# Patient Record
Sex: FEMALE | Race: WHITE | NOT HISPANIC OR LATINO | Employment: OTHER | URBAN - METROPOLITAN AREA
[De-identification: names, ages, dates, MRNs, and addresses within clinical notes are randomized per-mention and may not be internally consistent; named-entity substitution may affect disease eponyms.]

---

## 2017-03-01 PROBLEM — Z01.419 WELL WOMAN EXAM WITH ROUTINE GYNECOLOGICAL EXAM: Chronic | Status: ACTIVE | Noted: 2017-03-01

## 2017-07-10 ENCOUNTER — TRANSCRIBE ORDERS (OUTPATIENT)
Dept: PHYSICAL THERAPY | Facility: HOSPITAL | Age: 77
End: 2017-07-10

## 2017-07-10 ENCOUNTER — HOSPITAL ENCOUNTER (OUTPATIENT)
Dept: PHYSICAL THERAPY | Facility: HOSPITAL | Age: 77
Setting detail: THERAPIES SERIES
Discharge: HOME | End: 2017-07-10

## 2017-07-10 DIAGNOSIS — M51.36 DDD (DEGENERATIVE DISC DISEASE), LUMBAR: Primary | ICD-10-CM

## 2017-07-10 DIAGNOSIS — G89.29 CHRONIC LEFT-SIDED LOW BACK PAIN WITH LEFT-SIDED SCIATICA: Primary | ICD-10-CM

## 2017-07-10 DIAGNOSIS — M54.42 CHRONIC LEFT-SIDED LOW BACK PAIN WITH LEFT-SIDED SCIATICA: Primary | ICD-10-CM

## 2017-07-10 PROCEDURE — G8979 MOBILITY GOAL STATUS: HCPCS

## 2017-07-10 PROCEDURE — 97161 PT EVAL LOW COMPLEX 20 MIN: CPT

## 2017-07-10 PROCEDURE — G8978 MOBILITY CURRENT STATUS: HCPCS

## 2017-07-10 NOTE — THERAPY EVALUATION
Outpatient Physical Therapy Ortho Initial Evaluation   [facility]     Patient Name: Julia Blackmon  : 1940  MRN: [id number]  Today's Date: 7/10/2017      Visit Date: 07/10/2017    Patient Active Problem List   Diagnosis   • Well woman exam with routine gynecological exam        Past Medical History:   Diagnosis Date   • Arthritis    • Coronary artery disease    • Depression    • Diabetes mellitus    • History of chronic back pain         Past Surgical History:   Procedure Laterality Date   • CHOLECYSTECTOMY     • EYE SURGERY     • HYSTERECTOMY     • PACEMAKER IMPLANTATION         Visit Dx:     ICD-10-CM ICD-9-CM   1. Chronic left-sided low back pain with left-sided sciatica M54.42 724.2    G89.29 724.3     338.29             Patient History       07/10/17 1000          History    Chief Complaint Pain  -GB      Type of Pain --   ~25 years ago  -GB      Brief Description of Current Complaint Lois reports that she has had ongoing back pain in mid to left side with some pain into buttock and hip for about 25 years.  She has had multiple injections and medications without success.  Pain in her lower back is described as sharp and shooting, with deep ache when she sits and shifts weight toward her left side. Pain tends to be worst as the day progresses.  She gets the most relief with use of ice.  -GB      Patient's Rating of General Health Poor  -GB      Occupation/sports/leisure activities She enjoys social time with family and friends.  -GB      Pain     Pain Location Back  -GB      Pain at Present 7  -GB      Pain at Best 5  -GB      Pain at Worst 8  -GB      Fall Risk Assessment    Any falls in the past year: Yes  -GB      Number of falls reported in the last 12 months 3  -GB      Factors that contributed to the fall: --   Unknown causes  -GB      Daily Activities    Primary Language Angolan  -GB      Action taken if English not primary language --   None needed, she has been in [place] for [place] years and is  fluent  -GB      Barriers to learning Hearing;Visual  -GB      Pt Participated in POC and Goals Yes  -GB      Safety    Are you being hurt, hit, or frightened by anyone at home or in your life? No  -GB        User Key  (r) = Recorded By, (t) = Taken By, (c) = Cosigned By    Initials Name Provider Type    GB Chan Hand, PT Physical Therapist                PT Ortho       07/10/17 1700    Posture/Observations    Posture/Observations Comments Slumped posture with decreased lordosis;  -GB    Myotomal Screen- Lower Quarter Clearing    Hip flexion (L2) Bilateral:;4+ (Good +)  -GB    Knee extension (L3) Bilateral:;4+ (Good +)  -GB    Ankle DF (L4) Bilateral:;4+ (Good +)  -GB    Great toe extension (L5) Bilateral:;4+ (Good +)  -GB    Ankle PF (S1) Bilateral:;3+ (Fair +)  -GB    Knee flexion (S2) Bilateral:;4 (Good)  -GB    SI/Hip Screen- Lower Quarter Clearing    ASIS compression Left:;Positive  -GB    Maikol's/Volodymyr's test Left:;Negative  -GB    Special Tests/Palpation    Special Tests/Palpation Lumbar/SI;Hip  -GB    Lumbosacral Palpation    SI Left:;Tender  -GB    Lumbosacral Segment Left:;Guarded/taut   Left rotation at L4,5  -GB    Lumbar/SI Special Tests    SLR (Neural Tension) Left:;Negative  -GB    Hip/Thigh Palpation    Greater Trochanter --   Not tender  -GB    Leg Length Test    Apparent Unequal;Left Higher Leg   Left posterior rotation of Ilium  -GB    Trunk    Flexion AROM Deficit 45  -GB    Extension AROM Deficit 25  -GB    Lt Lat Flexion AROM Deficit 28  -GB    Right Lateral Flexion AROM Deficit 25  -GB    Lt Rotation AROM Deficit 25  -GB    Right Rotation AROM Deficit 25  -GB    Left Hip    Hip Extension Gross Movement (4+/5) good plus  -GB    Hip ABduction Gross Movement (4/5) good  -GB    Hip ADduction Gross Movement (4/5) good  -GB    Right Hip    Hip Extension Gross Movement (4+/5) good plus  -GB    Hip ABduction Gross Movement (4/5) good  -GB    Hip ADduction Gross Movement (4/5) good  -GB     Lower Extremity Flexibility    Hamstrings Bilateral:;Mildly limited  -GB      User Key  (r) = Recorded By, (t) = Taken By, (c) = Cosigned By    Initials Name Provider Type    ANSELMO Hand, PT Physical Therapist                                PT OP Goals       07/10/17 1700       PT Short Term Goals    STG Date to Achieve 07/24/17  -GB     STG 1 Patient is independent with HEP for flexibility and initial strengthening.  -GB     STG 1 Progress New  -GB     STG 2 Patient reports pain is reduced by 25%.  -GB     STG 2 Progress New  -GB     Long Term Goals    LTG Date to Achieve 08/09/17  -GB     LTG 1 Patient is independent with HEP for strengthening and stabilization.  -GB     LTG 1 Progress New  -GB     LTG 2 Modified Oswestry score is reduced by at least 10%.  -GB     LTG 2 Progress New  -GB     Time Calculation    PT Goal Re-Cert Due Date 08/09/17  -GB       User Key  (r) = Recorded By, (t) = Taken By, (c) = Cosigned By    Initials Name Provider Type    ANSELMO Hand, PT Physical Therapist                PT Assessment/Plan       07/10/17 1747 07/10/17 1744    PT Assessment    Functional Limitations  Performance in leisure activities;Performance in self-care ADL;Limitation in home management;Limitations in functional capacity and performance;Limitations in community activities  -GB    Impairments  Range of motion;Posture;Poor body mechanics;Pain;Muscle strength;Joint integrity;Joint mobility  -GB    Assessment Comments  Patient has findings consistent with diagnosis.  She has mixed signs that may suggest SI and DDD issues.  Because of the length of nature of her problems, PT does not anticipate any chnages for at least the first few weeks.  -GB    Please refer to paper survey for additional self-reported information  Yes  -GB    Rehab Potential  Fair  -GB    Patient/caregiver participated in establishment of treatment plan and goals  Yes  -GB    Patient would benefit from skilled therapy  intervention  Yes  -GB    PT Plan    PT Frequency  2x/week  -GB    Predicted Duration of Therapy Intervention (days/wks)  4 weeks  -GB    Planned CPT's?  PT EVAL LOW COMPLEXITY: 03840;PT NEUROMUSC RE-EDUCATION EA 15 MIN: 14861;PT MANUAL THERAPY EA 15 MIN: 58525;PT THER PROC EA 15 MIN: 92123;PT HOT OR COLD PACK TREAT MCARE  -GB    PT Plan Comments Progress with ther ex, and incorporate Dry needling and Astym with other manual therapy tenhniques.  -GB       User Key  (r) = Recorded By, (t) = Taken By, (c) = Cosigned By    Initials Name Provider Type    ANSELMO Hand, PT Physical Therapist                 Manual Rx (last 36 hours)      Manual Treatments       07/10/17 1700          Manual Rx 1    Manual Rx 1 Location (B) Lumbar paravertebrals, left Gluteus Medius, and Maxium  -GB      Manual Rx 1 Type Dry Needling  -GB      Manual Rx 1 Duration 5-6  -GB        User Key  (r) = Recorded By, (t) = Taken By, (c) = Cosigned By    Initials Name Provider Type    ANSELMO Hand, PT Physical Therapist                            Outcome Measures       07/10/17 1700          Modified Oswestry    Modified Oswestry Score/Comments 44%  -      Functional Assessment    Outcome Measure Options Modifed Owestry  -GB        User Key  (r) = Recorded By, (t) = Taken By, (c) = Cosigned By    Initials Name Provider Type    ANSELMO Hand, PT Physical Therapist            Time Calculation:   Start Time: 1015  Total Timed Code Minutes- PT: 5 minute(s)     Therapy Charges for Today     Code Description Service Date Service Provider Modifiers Qty    46556173393 HC PT MOBILITY CURRENT 7/10/2017 Chan Hand, PT GP, CK 1    07570299863 HC PT MOBILITY PROJECTED 7/10/2017 Chan Hand, PT GP, CJ 1    02071703667 HC PT EVAL LOW COMPLEXITY 4 7/10/2017 Chan Hand, PT GP 1          PT G-Codes  PT Professional Judgement Used?: Yes  Outcome Measure Options: Modifed Owestry  Score: 44%  Functional  Limitation: Mobility: Walking and moving around  Mobility: Walking and Moving Around Current Status (): At least 40 percent but less than 60 percent impaired, limited or restricted  Mobility: Walking and Moving Around Goal Status (): At least 20 percent but less than 40 percent impaired, limited or restricted         Chan Hand, PT  7/10/2017

## 2017-07-13 ENCOUNTER — HOSPITAL ENCOUNTER (OUTPATIENT)
Dept: PHYSICAL THERAPY | Facility: HOSPITAL | Age: 77
Setting detail: THERAPIES SERIES
Discharge: HOME | End: 2017-07-13

## 2017-07-13 DIAGNOSIS — G89.29 CHRONIC LEFT-SIDED LOW BACK PAIN WITH LEFT-SIDED SCIATICA: Primary | ICD-10-CM

## 2017-07-13 DIAGNOSIS — M54.42 CHRONIC LEFT-SIDED LOW BACK PAIN WITH LEFT-SIDED SCIATICA: Primary | ICD-10-CM

## 2017-07-13 PROCEDURE — 97110 THERAPEUTIC EXERCISES: CPT

## 2017-07-13 PROCEDURE — 97140 MANUAL THERAPY 1/> REGIONS: CPT

## 2017-07-13 NOTE — THERAPY TREATMENT NOTE
Outpatient Physical Therapy Ortho Treatment Note   [place]     Patient Name: Julia Blackmon  : 1940  MRN: [id number]  Today's Date: 2017      Visit Date: 2017    Visit Dx:    ICD-10-CM ICD-9-CM   1. Chronic left-sided low back pain with left-sided sciatica M54.42 724.2    G89.29 724.3     338.29       Patient Active Problem List   Diagnosis   • Well woman exam with routine gynecological exam        Past Medical History:   Diagnosis Date   • Arthritis    • Coronary artery disease    • Depression    • Diabetes mellitus    • History of chronic back pain         Past Surgical History:   Procedure Laterality Date   • CHOLECYSTECTOMY     • EYE SURGERY     • HYSTERECTOMY     • PACEMAKER IMPLANTATION               PT Ortho       07/10/17 1700    Posture/Observations    Posture/Observations Comments Slumped posture with decreased lordosis;  -GB    Myotomal Screen- Lower Quarter Clearing    Hip flexion (L2) Bilateral:;4+ (Good +)  -GB    Knee extension (L3) Bilateral:;4+ (Good +)  -GB    Ankle DF (L4) Bilateral:;4+ (Good +)  -GB    Great toe extension (L5) Bilateral:;4+ (Good +)  -GB    Ankle PF (S1) Bilateral:;3+ (Fair +)  -GB    Knee flexion (S2) Bilateral:;4 (Good)  -GB    SI/Hip Screen- Lower Quarter Clearing    ASIS compression Left:;Positive  -GB    Maikol's/Volodymyr's test Left:;Negative  -GB    Special Tests/Palpation    Special Tests/Palpation Lumbar/SI;Hip  -GB    Lumbosacral Palpation    SI Left:;Tender  -GB    Lumbosacral Segment Left:;Guarded/taut   Left rotation at L4,5  -GB    Lumbar/SI Special Tests    SLR (Neural Tension) Left:;Negative  -GB    Hip/Thigh Palpation    Greater Trochanter --   Not tender  -GB    Leg Length Test    Apparent Unequal;Left Higher Leg   Left posterior rotation of Ilium  -GB    Trunk    Flexion AROM Deficit 45  -GB    Extension AROM Deficit 25  -GB    Lt Lat Flexion AROM Deficit 28  -GB    Right Lateral Flexion AROM Deficit 25  -GB    Lt Rotation AROM Deficit  25  -GB    Right Rotation AROM Deficit 25  -GB    Left Hip    Hip Extension Gross Movement (4+/5) good plus  -GB    Hip ABduction Gross Movement (4/5) good  -GB    Hip ADduction Gross Movement (4/5) good  -GB    Right Hip    Hip Extension Gross Movement (4+/5) good plus  -GB    Hip ABduction Gross Movement (4/5) good  -GB    Hip ADduction Gross Movement (4/5) good  -GB    Lower Extremity Flexibility    Hamstrings Bilateral:;Mildly limited  -GB      User Key  (r) = Recorded By, (t) = Taken By, (c) = Cosigned By    Initials Name Provider Type    GB Chanrivera Hand, PT Physical Therapist                            PT Assessment/Plan       07/13/17 1030       PT Assessment    Assessment Comments Client was very hesitant to perform any exercises because she is always been sore afterward in the past.  She demonstrated exercises in the clinic well.  She noted a little discomfort with seated ball rollouts.  She reported some relief with manual therapy today.  -MM     PT Plan    PT Plan Comments Continue per plan of treatment with exercises as able.  Continue manual therapy.  -MM       User Key  (r) = Recorded By, (t) = Taken By, (c) = Cosigned By    Initials Name Provider Type    MM Mickey Melvin, PT Physical Therapist                    Exercises       07/13/17 1030          Subjective Comments    Subjective Comments Client reports she continues to have quite a bit of low back and left hip pain.  She says she has tried exercising in the past each time she has had increased pain afterward.  She does exercise at the [facility], but only uses the [employer] and recumbent bike.  -MM      Subjective Pain    Able to rate subjective pain? yes  -MM      Pre-Treatment Pain Level 6  -MM      Post-Treatment Pain Level 6  -MM      Exercise 1    Exercise Name 1 Included exercises in clinical setting per flow sheet.  -MM      Cueing 1 Verbal;Tactile;Demo  -MM      Time (Minutes) 1 15  -MM      Treatment Type 1 Ther Ex  -MM        User Key   (r) = Recorded By, (t) = Taken By, (c) = Cosigned By    Initials Name Provider Type    MM Mickey Melvin PT Physical Therapist                        Manual Rx (last 36 hours)      Manual Treatments       07/13/17 1030          Manual Rx 1    Manual Rx 1 Location Included dry needling to lumbar paravertebrals, left medial gluteal region, left gluteus medius, and piriformis posterior to greater trochanter. (5 min) also included ASTYM with soft tissue mobilization for low back and posterior lateral left hip.  Tenderness noted throughout.  Client was positioned prone.  Moderate pressure was used with ASTYM.  -MM      Manual Rx 1 Duration 30  -MM        User Key  (r) = Recorded By, (t) = Taken By, (c) = Cosigned By    Initials Name Provider Type    DANIEL Melvin PT Physical Therapist                        Outcome Measures       07/10/17 1700          Modified Oswestry    Modified Oswestry Score/Comments 44%  -GB      Functional Assessment    Outcome Measure Options Modifed Owestry  -GB        User Key  (r) = Recorded By, (t) = Taken By, (c) = Cosigned By    Initials Name Provider Type    ANSELMO Hand PT Physical Therapist            Time Calculation:   Start Time: 1030  Total Timed Code Minutes- PT: 45 minute(s)    Therapy Charges for Today     Code Description Service Date Service Provider Modifiers Qty    24337942005  PT MANUAL THERAPY EA 15 MIN 7/13/2017 Mickey Melvni, PT GP 2    80998449694 HC PT THER PROC EA 15 MIN 7/13/2017 Mickey Melvin, PT GP 1                    Mickey Mevlin, PT  7/13/2017

## 2017-07-18 ENCOUNTER — HOSPITAL ENCOUNTER (OUTPATIENT)
Dept: PHYSICAL THERAPY | Facility: HOSPITAL | Age: 77
Setting detail: THERAPIES SERIES
Discharge: HOME | End: 2017-07-18

## 2017-07-18 DIAGNOSIS — G89.29 CHRONIC LEFT-SIDED LOW BACK PAIN WITH LEFT-SIDED SCIATICA: Primary | ICD-10-CM

## 2017-07-18 DIAGNOSIS — M54.42 CHRONIC LEFT-SIDED LOW BACK PAIN WITH LEFT-SIDED SCIATICA: Primary | ICD-10-CM

## 2017-07-18 PROCEDURE — 97110 THERAPEUTIC EXERCISES: CPT

## 2017-07-18 PROCEDURE — 97140 MANUAL THERAPY 1/> REGIONS: CPT

## 2017-07-18 NOTE — THERAPY TREATMENT NOTE
Outpatient Physical Therapy Ortho Treatment Note  [facility]     Patient Name: Julia Blackmon  : 1940  MRN: [id number]  Today's Date: 2017      Visit Date: 2017    Visit Dx:    ICD-10-CM ICD-9-CM   1. Chronic left-sided low back pain with left-sided sciatica M54.42 724.2    G89.29 724.3     338.29       Patient Active Problem List   Diagnosis   • Well woman exam with routine gynecological exam        Past Medical History:   Diagnosis Date   • Arthritis    • Coronary artery disease    • Depression    • Diabetes mellitus    • History of chronic back pain         Past Surgical History:   Procedure Laterality Date   • CHOLECYSTECTOMY     • EYE SURGERY     • HYSTERECTOMY     • PACEMAKER IMPLANTATION                               PT Assessment/Plan       17 1030       PT Assessment    Assessment Comments Overall no changes in pain yet. She continues with quite a bit of low back and left hip discomfort. Exercises were tolerated without complaints today.  -MM     PT Plan    PT Plan Comments Continue per plan of treatment with manual therapy and exercises.   -MM       User Key  (r) = Recorded By, (t) = Taken By, (c) = Cosigned By    Initials Name Provider Type    DANIEL Melvin, PT Physical Therapist                    Exercises       17 1030          Subjective Comments    Subjective Comments Client reports left hip pain today at 7/10.  -MM      Subjective Pain    Able to rate subjective pain? yes  -MM      Pre-Treatment Pain Level 7  -MM      Post-Treatment Pain Level 7  -MM      Exercise 1    Exercise Name 1 INcluded exercises in clinical setting per flow sheet. Progressed exercises to include: total gym squats, standing 3-way leg kicks, nerve glides, and modified piriformis stretch.  -MM      Cueing 1 Verbal  -MM      Time (Minutes) 1 15  -MM      Treatment Type 1 Ther Ex  -MM        User Key  (r) = Recorded By, (t) = Taken By, (c) = Cosigned By    Initials Name Provider Type     MM Mickey Melvin, PT Physical Therapist                        Manual Rx (last 36 hours)      Manual Treatments       07/18/17 1030          Manual Rx 1    Manual Rx 1 Location Included dry needling to lumbar paravertebrals, left medial gluteal region, left gluteus medius, and piriformis posterior to greater trochanter. (5 min) also included ASTYM with soft tissue mobilization for low back and posterior lateral left hip.  Tenderness noted throughout.  Client was positioned prone.  Moderate pressure was used with ASTYM.  -MM      Manual Rx 1 Duration 30  -MM        User Key  (r) = Recorded By, (t) = Taken By, (c) = Cosigned By    Initials Name Provider Type    DANIEL Melvin, PT Physical Therapist                        Time Calculation:   Start Time: 1030  Total Timed Code Minutes- PT: 45 minute(s)    Therapy Charges for Today     Code Description Service Date Service Provider Modifiers Qty    78137293717  PT MANUAL THERAPY EA 15 MIN 7/18/2017 Mickey Melvin, PT GP 2    [id number]  PT THER PROC EA 15 MIN 7/18/2017 iMckey Melvin, PT GP 1                    Mickey Melvin, PT  7/18/2017

## 2017-07-20 ENCOUNTER — HOSPITAL ENCOUNTER (OUTPATIENT)
Dept: PHYSICAL THERAPY | Facility: HOSPITAL | Age: 77
Setting detail: THERAPIES SERIES
Discharge: HOME | End: 2017-07-20

## 2017-07-20 DIAGNOSIS — G89.29 CHRONIC LEFT-SIDED LOW BACK PAIN WITH LEFT-SIDED SCIATICA: Primary | ICD-10-CM

## 2017-07-20 DIAGNOSIS — M54.42 CHRONIC LEFT-SIDED LOW BACK PAIN WITH LEFT-SIDED SCIATICA: Primary | ICD-10-CM

## 2017-07-20 PROCEDURE — 97140 MANUAL THERAPY 1/> REGIONS: CPT

## 2017-07-20 PROCEDURE — 97110 THERAPEUTIC EXERCISES: CPT

## 2017-07-20 NOTE — THERAPY TREATMENT NOTE
Outpatient Physical Therapy Ortho Treatment Note  [facility]     Patient Name: Julia Blackmon  : 1940  MRN: [id number]  Today's Date: 2017      Visit Date: 2017    Visit Dx:    ICD-10-CM ICD-9-CM   1. Chronic left-sided low back pain with left-sided sciatica M54.42 724.2    G89.29 724.3     338.29       Patient Active Problem List   Diagnosis   • Well woman exam with routine gynecological exam        Past Medical History:   Diagnosis Date   • Arthritis    • Coronary artery disease    • Depression    • Diabetes mellitus    • History of chronic back pain         Past Surgical History:   Procedure Laterality Date   • CHOLECYSTECTOMY     • EYE SURGERY     • HYSTERECTOMY     • PACEMAKER IMPLANTATION                               PT Assessment/Plan       17 0900       PT Assessment    Assessment Comments Client notes some improvement in lateral hip pain. Low back pain continues. Exercises tolerated without complaints. Tenderness noted with manual therapy, but no complaints.  -MM     PT Plan    PT Plan Comments Continue per plan of treatment with manual therapy and exercises.  -MM       User Key  (r) = Recorded By, (t) = Taken By, (c) = Cosigned By    Initials Name Provider Type    MM Mickey Melvin, PT Physical Therapist                    Exercises       17 0900          Subjective Comments    Subjective Comments Client reports that her hip is feeling a little better, but her back still hurts quite a bit.  -MM      Subjective Pain    Able to rate subjective pain? yes  -MM      Pre-Treatment Pain Level 6  -MM      Post-Treatment Pain Level 6  -MM      Exercise 1    Exercise Name 1 Continued exercises in clinical setting per flow sheet. Updated exercises to include 3-way leg kicks with band and lower trunk rotation stretch.  -MM      Cueing 1 Verbal  -MM      Time (Minutes) 1 20  -MM      Treatment Type 1 Ther Ex  -MM        User Key  (r) = Recorded By, (t) = Taken By, (c) = Cosigned  By    Initials Name Provider Type    DANIEL Melvin PT Physical Therapist                        Manual Rx (last 36 hours)      Manual Treatments       07/20/17 0900          Manual Rx 1    Manual Rx 1 Location Included dry needling to lumbar paravertebrals, left medial gluteal region, left gluteus medius, and piriformis posterior to greater trochanter. also included ASTYM with soft tissue mobilization for low back and posterior lateral left hip.  Tenderness noted throughout.  Client was positioned prone.  Moderate pressure was used with ASTYM.  -MM      Manual Rx 1 Duration 25  -MM        User Key  (r) = Recorded By, (t) = Taken By, (c) = Cosigned By    Initials Name Provider Type    DANIEL Melvin PT Physical Therapist                        Time Calculation:   Start Time: 0900  Total Timed Code Minutes- PT: 45 minute(s)    Therapy Charges for Today     Code Description Service Date Service Provider Modifiers Qty    70252765735  PT MANUAL THERAPY EA 15 MIN 7/20/2017 Mickey Melvin PT GP 2    [id number]  PT THER PROC EA 15 MIN 7/20/2017 Mickey Melvin PT GP 1                    Mickey Melvin PT  7/20/2017

## 2017-07-25 ENCOUNTER — HOSPITAL ENCOUNTER (OUTPATIENT)
Dept: PHYSICAL THERAPY | Facility: HOSPITAL | Age: 77
Setting detail: THERAPIES SERIES
Discharge: HOME | End: 2017-07-25

## 2017-07-25 DIAGNOSIS — G89.29 CHRONIC LEFT-SIDED LOW BACK PAIN WITH LEFT-SIDED SCIATICA: Primary | ICD-10-CM

## 2017-07-25 DIAGNOSIS — M54.42 CHRONIC LEFT-SIDED LOW BACK PAIN WITH LEFT-SIDED SCIATICA: Primary | ICD-10-CM

## 2017-07-25 PROCEDURE — 97110 THERAPEUTIC EXERCISES: CPT

## 2017-07-25 PROCEDURE — 97140 MANUAL THERAPY 1/> REGIONS: CPT

## 2017-07-25 NOTE — THERAPY TREATMENT NOTE
Outpatient Physical Therapy Ortho Treatment Note  [facility]     Patient Name: Julia Blackmon  : 1940  MRN: [id number]  Today's Date: 2017      Visit Date: 2017    Visit Dx:    ICD-10-CM ICD-9-CM   1. Chronic left-sided low back pain with left-sided sciatica M54.42 724.2    G89.29 724.3     338.29       Patient Active Problem List   Diagnosis   • Well woman exam with routine gynecological exam        Past Medical History:   Diagnosis Date   • Arthritis    • Coronary artery disease    • Depression    • Diabetes mellitus    • History of chronic back pain         Past Surgical History:   Procedure Laterality Date   • CHOLECYSTECTOMY     • EYE SURGERY     • HYSTERECTOMY     • PACEMAKER IMPLANTATION                               PT Assessment/Plan       17 0945       PT Assessment    Assessment Comments Client reports quite a bit of pain at the time of treatment today.  She still tolerated exercises in the clinic without increased complaints.  She noted some relief with dry needling and ASTYM.  -MM     PT Plan    PT Plan Comments Continue per plan of treatment with exercises and manual therapy.  -MM       User Key  (r) = Recorded By, (t) = Taken By, (c) = Cosigned By    Initials Name Provider Type    MM Mickey Melvin, PT Physical Therapist                    Exercises       17 0905          Subjective Comments    Subjective Comments Client reports severe pain today at 8/10.  She started having quite a bit of pain yesterday.  She did feel pretty good after last visit for a couple days.  -MM      Subjective Pain    Able to rate subjective pain? yes  -MM      Pre-Treatment Pain Level 8  -MM      Post-Treatment Pain Level 6  -MM      Exercise 1    Exercise Name 1 Continued exercises in clinical setting per flow sheet. Updated exercises to include 3-way leg kicks with band and lower trunk rotation stretch.  -MM      Cueing 1 Verbal  -MM      Time (Minutes) 1 20  -MM      Treatment Type  1 Ther Ex  -MM        User Key  (r) = Recorded By, (t) = Taken By, (c) = Cosigned By    Initials Name Provider Type    DANIEL Melvin PT Physical Therapist                        Manual Rx (last 36 hours)      Manual Treatments       07/25/17 0945          Manual Rx 1    Manual Rx 1 Location Included dry needling to lumbar paravertebrals, left medial gluteal region, left gluteus medius, and piriformis posterior to greater trochanter. also included ASTYM with soft tissue mobilization for low back and posterior lateral left hip.  Tenderness noted throughout.  Client was positioned prone.  Moderate pressure was used with ASTYM.  -MM      Manual Rx 1 Duration 25  -MM        User Key  (r) = Recorded By, (t) = Taken By, (c) = Cosigned By    Initials Name Provider Type    DANIEL Melvin PT Physical Therapist                        Time Calculation:   Start Time: 0945  Total Timed Code Minutes- PT: 45 minute(s)    Therapy Charges for Today     Code Description Service Date Service Provider Modifiers Qty    27405480443  PT MANUAL THERAPY EA 15 MIN 7/25/2017 Mickey Melvin, PT GP 2    80992109948 HC PT THER PROC EA 15 MIN 7/25/2017 Mickey Melvin, PT GP 1                    Mickey Melvin PT  7/25/2017

## 2017-07-27 ENCOUNTER — TRANSCRIBE ORDERS (OUTPATIENT)
Dept: ADMINISTRATIVE | Facility: HOSPITAL | Age: 77
End: 2017-07-27

## 2017-07-27 ENCOUNTER — HOSPITAL ENCOUNTER (OUTPATIENT)
Dept: PHYSICAL THERAPY | Facility: HOSPITAL | Age: 77
Setting detail: THERAPIES SERIES
Discharge: HOME | End: 2017-07-27

## 2017-07-27 DIAGNOSIS — M54.42 CHRONIC LEFT-SIDED LOW BACK PAIN WITH LEFT-SIDED SCIATICA: Primary | ICD-10-CM

## 2017-07-27 DIAGNOSIS — M75.41 IMPINGEMENT SYNDROME OF RIGHT SHOULDER: Primary | ICD-10-CM

## 2017-07-27 DIAGNOSIS — G89.29 CHRONIC LEFT-SIDED LOW BACK PAIN WITH LEFT-SIDED SCIATICA: Primary | ICD-10-CM

## 2017-07-27 PROCEDURE — 97140 MANUAL THERAPY 1/> REGIONS: CPT

## 2017-07-27 PROCEDURE — 97110 THERAPEUTIC EXERCISES: CPT

## 2017-07-27 NOTE — THERAPY TREATMENT NOTE
Outpatient Physical Therapy Ortho Treatment Note  [facility]     Patient Name: Julia Blackmon  : 1940  MRN: [id number]  Today's Date: 2017      Visit Date: 2017    Visit Dx:    ICD-10-CM ICD-9-CM   1. Chronic left-sided low back pain with left-sided sciatica M54.42 724.2    G89.29 724.3     338.29       Patient Active Problem List   Diagnosis   • Well woman exam with routine gynecological exam        Past Medical History:   Diagnosis Date   • Arthritis    • Coronary artery disease    • Depression    • Diabetes mellitus    • History of chronic back pain         Past Surgical History:   Procedure Laterality Date   • CHOLECYSTECTOMY     • EYE SURGERY     • HYSTERECTOMY     • PACEMAKER IMPLANTATION                               PT Assessment/Plan       17       PT Assessment    Assessment Comments Client reports some improvement and left hip pain.  Low back pain continues.  Exercises were progressed today and tolerated without complaints of increased pain.  -MM     PT Plan    PT Plan Comments Continue per plan of treatment with exercises and manual therapy.  -MM       User Key  (r) = Recorded By, (t) = Taken By, (c) = Cosigned By    Initials Name Provider Type    MM Mickey Melvin, PT Physical Therapist                    Exercises       17          Subjective Comments    Subjective Comments Client reports low back pain continues.  She has noticed some improvement with left hip pain.  -MM      Subjective Pain    Able to rate subjective pain? yes  -MM      Pre-Treatment Pain Level 7  -MM      Post-Treatment Pain Level 7  -MM      Exercise 1    Exercise Name 1 Included exercises in clinical setting per flow sheet.  Updated exercises to include: Reverse lunges in parallel bars, bridging with stability ball, lower trunk rotation with stability ball, and leg raises on all fours.  -MM      Cueing 1 Tactile;Verbal;Demo  -MM      Time (Minutes) 1 30  -MM        User Key  (r) =  Recorded By, (t) = Taken By, (c) = Cosigned By    Initials Name Provider Type    DANIEL Melvin, PT Physical Therapist                        Manual Rx (last 36 hours)      Manual Treatments       07/27/17 0915          Manual Rx 1    Manual Rx 1 Location Included dry needling to lumbar paravertebrals, left medial gluteal region, left gluteus medius, and piriformis posterior to greater trochanter. also included ASTYM with soft tissue mobilization for low back and posterior lateral left hip.  Tenderness noted throughout.  Client was positioned prone.  Moderate pressure was used with ASTYM.  -MM      Manual Rx 1 Duration 15  -MM        User Key  (r) = Recorded By, (t) = Taken By, (c) = Cosigned By    Initials Name Provider Type    DANIEL Melvin PT Physical Therapist                        Time Calculation:   Start Time: 0915  Total Timed Code Minutes- PT: 45 minute(s)    Therapy Charges for Today     Code Description Service Date Service Provider Modifiers Qty    48359175149  PT MANUAL THERAPY EA 15 MIN 7/27/2017 Mickey Melvin, PT GP 1    [id number]  PT THER PROC EA 15 MIN 7/27/2017 Mickey Melvin, PT GP 2                    Mickey Melvin, PT  7/27/2017

## 2017-08-02 ENCOUNTER — HOSPITAL ENCOUNTER (OUTPATIENT)
Dept: PHYSICAL THERAPY | Facility: HOSPITAL | Age: 77
Setting detail: THERAPIES SERIES
Discharge: HOME | End: 2017-08-02

## 2017-08-02 DIAGNOSIS — M54.42 CHRONIC LEFT-SIDED LOW BACK PAIN WITH LEFT-SIDED SCIATICA: Primary | ICD-10-CM

## 2017-08-02 DIAGNOSIS — G89.29 CHRONIC LEFT-SIDED LOW BACK PAIN WITH LEFT-SIDED SCIATICA: Primary | ICD-10-CM

## 2017-08-02 PROCEDURE — 97110 THERAPEUTIC EXERCISES: CPT

## 2017-08-02 PROCEDURE — 97140 MANUAL THERAPY 1/> REGIONS: CPT

## 2017-08-02 NOTE — THERAPY TREATMENT NOTE
Outpatient Physical Therapy Ortho Treatment Note  [facility]     Patient Name: Julia Blackmon  : 1940  MRN: [id number]  Today's Date: 2017      Visit Date: 2017    Visit Dx:    ICD-10-CM ICD-9-CM   1. Chronic left-sided low back pain with left-sided sciatica M54.42 724.2    G89.29 724.3     338.29       Patient Active Problem List   Diagnosis   • Well woman exam with routine gynecological exam        Past Medical History:   Diagnosis Date   • Arthritis    • Coronary artery disease    • Depression    • Diabetes mellitus    • History of chronic back pain         Past Surgical History:   Procedure Laterality Date   • CHOLECYSTECTOMY     • EYE SURGERY     • HYSTERECTOMY     • PACEMAKER IMPLANTATION                               PT Assessment/Plan       17 1039       PT Assessment    Assessment Comments Patient is with little change and remains in severe pain.  She sturggles with any stretching and because of lack of success with current interventions, Dry Needling held today to focus on potential benefits from Astym.  Only minimal abnormal tissue texture is noticed with Astym to left gluteal and (B) lower paravertebrals.  -GB     PT Plan    PT Plan Comments Continue with care x 1 more week until reassessment.  -GB       User Key  (r) = Recorded By, (t) = Taken By, (c) = Cosigned By    Initials Name Provider Type    ANSELMO Hand, PT Physical Therapist                    Exercises       17 1000          Subjective Comments    Subjective Comments Patient feels that nothing is helping.  She notices increased soreness when stretching, but like doing leg exercises.  -GB      Subjective Pain    Able to rate subjective pain? yes  -GB      Pre-Treatment Pain Level 7  -GB      Post-Treatment Pain Level 7  -GB      Exercise 1    Exercise Name 1 Ther ex in clinical setting as per written flow sheet  -GB      Time (Minutes) 1 20  -GB        User Key  (r) = Recorded By, (t) = Taken By,  (c) = Cosigned By    Initials Name Provider Type    ANSELMO Hand, PT Physical Therapist                        Manual Rx (last 36 hours)      Manual Treatments       08/02/17 0900          Manual Rx 1    Manual Rx 1 Location (B) Lumbosacral areas and upper gluteal areas  -GB      Manual Rx 1 Type Astym, STM  -GB      Manual Rx 1 Duration 12  -GB        User Key  (r) = Recorded By, (t) = Taken By, (c) = Cosigned By    Initials Name Provider Type    ANSELMO Hand, PT Physical Therapist                    Therapy Education       08/02/17 1042          Therapy Education    Given Symptoms/condition management   Time spent discussing treatment options and other therapy options  -GB        User Key  (r) = Recorded By, (t) = Taken By, (c) = Cosigned By    Initials Name Provider Type    ANSELMO Hand, PT Physical Therapist                Time Calculation:   Start Time: 0905  Total Timed Code Minutes- PT: 34 minute(s)    Therapy Charges for Today     Code Description Service Date Service Provider Modifiers Qty    95978174234  PT THER PROC EA 15 MIN 8/2/2017 Chan Hand, PT GP 1    [id number]  PT MANUAL THERAPY EA 15 MIN 8/2/2017 Chan Hand, PT GP 1                    Chan Hand, PT  8/2/2017

## 2017-08-03 ENCOUNTER — APPOINTMENT (OUTPATIENT)
Dept: CT IMAGING | Facility: HOSPITAL | Age: 77
End: 2017-08-03

## 2017-08-03 ENCOUNTER — APPOINTMENT (OUTPATIENT)
Dept: GENERAL RADIOLOGY | Facility: HOSPITAL | Age: 77
End: 2017-08-03

## 2017-08-07 ENCOUNTER — HOSPITAL ENCOUNTER (OUTPATIENT)
Dept: PHYSICAL THERAPY | Facility: HOSPITAL | Age: 77
Setting detail: THERAPIES SERIES
Discharge: HOME | End: 2017-08-07

## 2017-08-07 DIAGNOSIS — M54.42 CHRONIC LEFT-SIDED LOW BACK PAIN WITH LEFT-SIDED SCIATICA: Primary | ICD-10-CM

## 2017-08-07 DIAGNOSIS — G89.29 CHRONIC LEFT-SIDED LOW BACK PAIN WITH LEFT-SIDED SCIATICA: Primary | ICD-10-CM

## 2017-08-07 PROCEDURE — G8978 MOBILITY CURRENT STATUS: HCPCS

## 2017-08-07 PROCEDURE — 97140 MANUAL THERAPY 1/> REGIONS: CPT

## 2017-08-07 PROCEDURE — G8979 MOBILITY GOAL STATUS: HCPCS

## 2017-08-07 NOTE — THERAPY PROGRESS REPORT/RE-CERT
Outpatient Physical Therapy Ortho Re-Assessment   [facility]     Patient Name: Julia Blackmon  : 1940  MRN: [id number]  Today's Date: 2017      Visit Date: 2017    Patient Active Problem List   Diagnosis   • Well woman exam with routine gynecological exam        Past Medical History:   Diagnosis Date   • Arthritis    • Coronary artery disease    • Depression    • Diabetes mellitus    • History of chronic back pain         Past Surgical History:   Procedure Laterality Date   • CHOLECYSTECTOMY     • EYE SURGERY     • HYSTERECTOMY     • PACEMAKER IMPLANTATION         Visit Dx:     ICD-10-CM ICD-9-CM   1. Chronic left-sided low back pain with left-sided sciatica M54.42 724.2    G89.29 724.3     338.29                 PT Ortho       17 0900    Posture/Observations    Posture/Observations Comments Slumped posture with decreased lordosis;  -MM    Myotomal Screen- Lower Quarter Clearing    Hip flexion (L2) Right:;4+ (Good +);Left:;4- (Good -)  -MM    Knee extension (L3) Right:;4+ (Good +);Left:;4 (Good)  -MM    Ankle DF (L4) Right:;5 (Normal);Left:;4+ (Good +)  -MM    Ankle PF (S1) Right:;5 (Normal);Left:;4 (Good)  -MM    Knee flexion (S2) Bilateral:;4+ (Good +)  -MM    Lumbosacral Palpation    SI Left:;Tender  -MM    Lumbosacral Segment Left:;Guarded/taut   Left rotation at L4,5  -MM    Trunk    Flexion AROM Deficit 45  -MM    Extension AROM Deficit 20  -MM    Lt Lat Flexion AROM Deficit 28  -MM    Right Lateral Flexion AROM Deficit 25  -MM    Lt Rotation AROM Deficit 25  -MM    Right Rotation AROM Deficit 25  -MM    Left Hip    Hip Extension Gross Movement (4+/5) good plus  -MM    Hip ABduction Gross Movement (4/5) good  -MM    Hip ADduction Gross Movement (5/5) normal  -MM    Right Hip    Hip Extension Gross Movement (4+/5) good plus  -MM    Hip ABduction Gross Movement (4+/5) good plus  -MM    Hip ADduction Gross Movement (5/5) normal  -MM    Lower Extremity Flexibility    Hamstrings  Bilateral:;Mildly limited  -MM      User Key  (r) = Recorded By, (t) = Taken By, (c) = Cosigned By    Initials Name Provider Type    DANIEL Melvin, PT Physical Therapist                                PT OP Goals       08/07/17 0900       PT Short Term Goals    STG Date to Achieve 07/24/17  -MM     STG 1 Patient is independent with HEP for flexibility and initial strengthening.  -MM     STG 1 Progress Met  -MM     STG 2 Patient reports pain is reduced by 25%.  -MM     STG 2 Progress Ongoing  -MM     Long Term Goals    LTG Date to Achieve 08/09/17  -MM     LTG 1 Patient is independent with HEP for strengthening and stabilization.  -MM     LTG 1 Progress Ongoing  -MM     LTG 2 Modified Oswestry score is reduced by at least 10%.  -MM     LTG 2 Progress Ongoing  -MM     LTG 2 Progress Comments 44% to 62%  -MM     Time Calculation    PT Goal Re-Cert Due Date 09/06/17  -MM       User Key  (r) = Recorded By, (t) = Taken By, (c) = Cosigned By    Initials Name Provider Type    DANIEL Melvin, PT Physical Therapist                PT Assessment/Plan       08/07/17 0900       PT Assessment    Assessment Comments Client continues with low back and left hip pain. She has noticed some benefit in left hip discomfort since starting dry needling. Signs are consistent with low back and left hip pain related to degenerative changes in her spine. Left hip strength is impaired. Further skilled care is recommended for 2-3 more visits to minimize pain.  -MM     PT Plan    PT Frequency 2x/week  -MM     Predicted Duration of Therapy Intervention (days/wks) 2-3 more visits  -MM     Planned CPT's? PT THER PROC EA 15 MIN: 08862;PT MANUAL THERAPY EA 15 MIN: 34491  -MM     PT Plan Comments Continue for 2-3 more visits with manual therapy to minimize pain.  -MM       User Key  (r) = Recorded By, (t) = Taken By, (c) = Cosigned By    Initials Name Provider Type    DANIEL Melvin, PT Physical Therapist                  Exercises        08/07/17 0900          Subjective Comments    Subjective Comments Client reports back pain today at 7/10. She does feel like her hip has improved some, but her back still hurts a lot. Last visit she did not receive dry needling and she felt that dry needling with ASTYM was more helpful than AStYM alone.  -MM      Subjective Pain    Able to rate subjective pain? yes  -MM      Pre-Treatment Pain Level 7  -MM      Post-Treatment Pain Level 7  -MM        User Key  (r) = Recorded By, (t) = Taken By, (c) = Cosigned By    Initials Name Provider Type    DANIEL Melvin PT Physical Therapist           Manual Rx (last 36 hours)      Manual Treatments       08/07/17 0900          Manual Rx 1    Manual Rx 1 Location Re-exam time included in manual therapy. Also included dry needling for bilateral lumbar paraspinals and left lateral and posterior hip. Included ASTYM after dry needling.   -MM      Manual Rx 1 Duration 45  -MM        User Key  (r) = Recorded By, (t) = Taken By, (c) = Cosigned By    Initials Name Provider Type    DANIEL Melvin PT Physical Therapist                            Outcome Measures       08/07/17 1100 08/07/17 0900       Modified Oswestry    Modified Oswestry Score/Comments  62%  -MM     Functional Assessment    Outcome Measure Options --  -MM Modifed Owestry  -MM       User Key  (r) = Recorded By, (t) = Taken By, (c) = Cosigned By    Initials Name Provider Type    DANIEL Melvin PT Physical Therapist            Time Calculation:   Start Time: 0900  Total Timed Code Minutes- PT: 45 minute(s)     Therapy Charges for Today     Code Description Service Date Service Provider Modifiers Qty    65861668717 HC PT MOBILITY CURRENT 8/7/2017 Mickey Melvin, PT GP, CK 1    89299821524 HC PT MOBILITY PROJECTED 8/7/2017 Mickey Melvin, PT GP, CJ 1    [id number] HC PT MANUAL THERAPY EA 15 MIN 8/7/2017 Mickey Melvin, PT GP 3          PT G-Codes  PT Professional Judgement Used?: Yes  Outcome  Measure Options: Neil Quiñonez  Score: 62%  Functional Limitation: Mobility: Walking and moving around  Mobility: Walking and Moving Around Current Status (): At least 40 percent but less than 60 percent impaired, limited or restricted  Mobility: Walking and Moving Around Goal Status (): At least 20 percent but less than 40 percent impaired, limited or restricted         Mickey Melvin, PT  8/7/2017

## 2017-08-10 ENCOUNTER — HOSPITAL ENCOUNTER (OUTPATIENT)
Dept: PHYSICAL THERAPY | Facility: HOSPITAL | Age: 77
Setting detail: THERAPIES SERIES
Discharge: HOME | End: 2017-08-10

## 2017-08-10 DIAGNOSIS — G89.29 CHRONIC LEFT-SIDED LOW BACK PAIN WITH LEFT-SIDED SCIATICA: Primary | ICD-10-CM

## 2017-08-10 DIAGNOSIS — M54.42 CHRONIC LEFT-SIDED LOW BACK PAIN WITH LEFT-SIDED SCIATICA: Primary | ICD-10-CM

## 2017-08-10 PROCEDURE — 97140 MANUAL THERAPY 1/> REGIONS: CPT

## 2017-08-10 PROCEDURE — G8978 MOBILITY CURRENT STATUS: HCPCS

## 2017-08-10 PROCEDURE — 97110 THERAPEUTIC EXERCISES: CPT

## 2017-08-10 PROCEDURE — G8979 MOBILITY GOAL STATUS: HCPCS

## 2017-08-10 PROCEDURE — G8980 MOBILITY D/C STATUS: HCPCS

## 2017-08-10 NOTE — THERAPY DISCHARGE NOTE
Outpatient Physical Therapy Ortho Treatment Note/Discharge Summary   [facility]     Patient Name: Julia Blackmon  : 1940  MRN: [id number]  Today's Date: 8/10/2017      Visit Date: 08/10/2017    Visit Dx:    ICD-10-CM ICD-9-CM   1. Chronic left-sided low back pain with left-sided sciatica M54.42 724.2    G89.29 724.3     338.29       Patient Active Problem List   Diagnosis   • Well woman exam with routine gynecological exam        Past Medical History:   Diagnosis Date   • Arthritis    • Coronary artery disease    • Depression    • Diabetes mellitus    • History of chronic back pain         Past Surgical History:   Procedure Laterality Date   • CHOLECYSTECTOMY     • EYE SURGERY     • HYSTERECTOMY     • PACEMAKER IMPLANTATION                               PT Assessment/Plan       08/10/17 09       PT Assessment    Assessment Comments Client continues with significant low back pain. Hip pain improved some and she noted some benefit from dry needling and manual therapy, but overall she hasn't noticed much of a change. She has reached the maximum benefit of therapy and is being discharged due to lack of progress. She has a good understanding of exercises to continue on her own to maximize function.  -MM     PT Plan    PT Plan Comments Discharge due to lack of progress.  -MM       User Key  (r) = Recorded By, (t) = Taken By, (c) = Cosigned By    Initials Name Provider Type    MM Mickey Melvin, PT Physical Therapist                    Exercises       08/10/17 09          Subjective Comments    Subjective Comments Client reports a little relief with Dry needling and AStYM, but overall she continues with quite a bit of pain.   -MM      Subjective Pain    Able to rate subjective pain? yes  -MM      Pre-Treatment Pain Level 7  -MM      Post-Treatment Pain Level 7  -MM      Exercise 1    Exercise Name 1 Updated and reviewed home program. Provided handout for home.  -MM      Cueing 1 Verbal;Tactile  -MM       Time (Minutes) 1 15  -MM        User Key  (r) = Recorded By, (t) = Taken By, (c) = Cosigned By    Initials Name Provider Type    DANIEL Melvin PT Physical Therapist                        Manual Rx (last 36 hours)      Manual Treatments       08/10/17 0900          Manual Rx 1    Manual Rx 1 Location Included dry needling for bilateral lumbar paraspinals and left lateral and posterior hip. Included ASTYM after dry needling.   -MM      Manual Rx 1 Duration 30  -MM        User Key  (r) = Recorded By, (t) = Taken By, (c) = Cosigned By    Initials Name Provider Type    DANIEL Melvin PT Physical Therapist                PT OP Goals       08/10/17 0900       PT Short Term Goals    STG Date to Achieve 07/24/17  -MM     STG 1 Patient is independent with HEP for flexibility and initial strengthening.  -MM     STG 1 Progress Met  -MM     STG 2 Patient reports pain is reduced by 25%.  -MM     STG 2 Progress Not Met  -MM     Long Term Goals    LTG Date to Achieve 08/09/17  -MM     LTG 1 Patient is independent with HEP for strengthening and stabilization.  -MM     LTG 1 Progress Met  -MM     LTG 2 Modified Oswestry score is reduced by at least 10%.  -MM     LTG 2 Progress Not Met  -MM       User Key  (r) = Recorded By, (t) = Taken By, (c) = Cosigned By    Initials Name Provider Type    DANIEL Melvin PT Physical Therapist                    Time Calculation:   Start Time: 0900  Total Timed Code Minutes- PT: 45 minute(s)    Therapy Charges for Today     Code Description Service Date Service Provider Modifiers Qty    69854087303 HC PT MOBILITY CURRENT 8/10/2017 Mickey Melvin, PT GP, CK 1    48991517418 HC PT MOBILITY PROJECTED 8/10/2017 Mickey Melvin, PT GP, CJ 1    01338916273 HC PT MOBILITY DISCHARGE 8/10/2017 Mickey Melvin PT GP, CK 1    76709618140 HC PT MANUAL THERAPY EA 15 MIN 8/10/2017 Mickey Melvin, PT GP 2    [id number] HC PT THER PROC EA 15 MIN 8/10/2017 Mickey Melvin, PT GP 1           PT G-Codes  PT Professional Judgement Used?: Yes  Functional Limitation: Mobility: Walking and moving around  Mobility: Walking and Moving Around Current Status (): At least 40 percent but less than 60 percent impaired, limited or restricted  Mobility: Walking and Moving Around Goal Status (): At least 20 percent but less than 40 percent impaired, limited or restricted  Mobility: Walking and Moving Around Discharge Status (): At least 40 percent but less than 60 percent impaired, limited or restricted     OP PT Discharge Summary  Date of Discharge: 08/10/17  Reason for Discharge: Maximum functional potential achieved  Outcomes Achieved: Refer to plan of care for updates on goals achieved  Discharge Destination: Home with home program  Discharge Instructions: Client to return to MD to explore other options to minimize pain. She plans to continue exercises as able.       Mickey Melvin, PT  8/10/2017

## 2017-09-18 ENCOUNTER — HOSPITAL ENCOUNTER (OUTPATIENT)
Dept: PHYSICAL THERAPY | Facility: HOSPITAL | Age: 77
Setting detail: THERAPIES SERIES
Discharge: HOME | End: 2017-09-18

## 2017-09-18 ENCOUNTER — TRANSCRIBE ORDERS (OUTPATIENT)
Dept: PHYSICAL THERAPY | Facility: HOSPITAL | Age: 77
End: 2017-09-18

## 2017-09-18 DIAGNOSIS — G89.29 CHRONIC PAIN OF BOTH SHOULDERS: Primary | ICD-10-CM

## 2017-09-18 DIAGNOSIS — M25.511 RIGHT SHOULDER PAIN, UNSPECIFIED CHRONICITY: Primary | ICD-10-CM

## 2017-09-18 DIAGNOSIS — M25.511 CHRONIC PAIN OF BOTH SHOULDERS: Primary | ICD-10-CM

## 2017-09-18 DIAGNOSIS — M67.911 DISORDER OF RIGHT ROTATOR CUFF: ICD-10-CM

## 2017-09-18 DIAGNOSIS — M25.512 CHRONIC PAIN OF BOTH SHOULDERS: Primary | ICD-10-CM

## 2017-09-18 PROCEDURE — G8985 CARRY GOAL STATUS: HCPCS

## 2017-09-18 PROCEDURE — 97162 PT EVAL MOD COMPLEX 30 MIN: CPT

## 2017-09-18 PROCEDURE — G8984 CARRY CURRENT STATUS: HCPCS

## 2017-09-18 NOTE — THERAPY EVALUATION
Outpatient Physical Therapy Ortho Initial Evaluation  [facility]     Patient Name: Julia Blackmon  : 1940  MRN: [id number]  Today's Date: 2017      Visit Date: 2017    Patient Active Problem List   Diagnosis   • Well woman exam with routine gynecological exam        Past Medical History:   Diagnosis Date   • Arthritis    • Coronary artery disease    • Depression    • Diabetes mellitus    • History of chronic back pain         Past Surgical History:   Procedure Laterality Date   • CHOLECYSTECTOMY     • EYE SURGERY     • HYSTERECTOMY     • PACEMAKER IMPLANTATION         Visit Dx:     ICD-10-CM ICD-9-CM   1. Chronic pain of both shoulders M25.512 719.41    G89.29 338.29    M25.511    2. Disorder of right rotator cuff M67.911 726.10             Patient History       17 0845          History    Chief Complaint Pain;Muscle weakness;Muscle tenderness;Joint stiffness;Difficulty with daily activities  -MM      Type of Pain Shoulder pain  -MM      Date Current Problem(s) Began 17  -MM      Brief Description of Current Complaint Client presents with complaints of bilateral shoulder pain.  She reports right shoulder pain is frequently severe.  She also has some left shoulder pain.  She's had pain in her shoulders for the past 8 months.  She was referred for physical therapy with diagnosis of rotator cuff tendinopathy. She also complains of neck stiffness.  -MM      Patient/Caregiver Goals Relieve pain;Improve mobility;Improve strength  -MM      Occupation/sports/leisure activities She enjoys social time with family and friends.  -MM      Pain     Pain Location Shoulder   R > L  -MM      Pain at Present 9  -MM      Pain at Best 7  -MM      Pain at Worst 10  -MM      Pain Frequency Constant/continuous  -MM      Pain Description Aching;Sharp;Shooting  -MM      Pain Comments shoulder pain is worse with mobility. Her shoulders also ache at night.  -MM      Difficulties with ADL's? reaching,  lifting, brushing hair  -MM      Fall Risk Assessment    Any falls in the past year: Yes  -MM      Number of falls reported in the last 12 months 3  -MM      Factors that contributed to the fall: --   Unknown causes  -MM      Daily Activities    Primary Language [place]  -MM      Action taken if English not primary language --   None needed, she has been in [place] years and is fluent  -MM      Barriers to learning Hearing;Visual  -MM      Pt Participated in POC and Goals Yes  -MM        User Key  (r) = Recorded By, (t) = Taken By, (c) = Cosigned By    Initials Name Provider Type    DANIEL Melvin PT Physical Therapist                PT Ortho       09/18/17 3393    Posture/Observations    Posture/Observations Comments Slumped posture with decreased lordosis;  -MM    ROM (Range of Motion)    General ROM Detail Palpation: R: Tenderness clavicle, anterior shoulder, and lateral shoulder.  Left: Tenderness lateral shoulder.  Overall right shoulder mobility is significantly limited passively and actively.  Passive mobility is slightly better than active.  -MM    Left Shoulder    Flexion AROM Deficit 88°  -MM    Extension AROM Deficit 40°  -MM    ABduction AROM Deficit 82°  -MM    External Rotation PROM Deficit 47°  -MM    Internal Rotation AROM Deficit T7  -MM    Right Shoulder    Flexion AROM Deficit 58° with pain  -MM    Extension AROM Deficit 40°   -MM    ABduction AROM Deficit 60° with pain  -MM    External Rotation AROM Deficit 18° with pain  -MM    Internal Rotation AROM Deficit L1  -MM    MMT (Manual Muscle Testing)    General MMT Assessment Detail Left shoulder strength is 4 minus/5 throughout.  Unable to test right shoulder strength due to significant pain and limited mobility.  -MM      User Key  (r) = Recorded By, (t) = Taken By, (c) = Cosigned By    Initials Name Provider Type    DANIEL Melvin PT Physical Therapist                            Therapy Education       09/18/17 1615           Therapy Education    Given HEP  -MM      Program New  -MM      How Provided Verbal;Demonstration;Written  -MM      Provided to Patient  -MM      Level of Understanding Verbalized  -MM        User Key  (r) = Recorded By, (t) = Taken By, (c) = Cosigned By    Initials Name Provider Type    DANIEL Melvin, PT Physical Therapist                PT OP Goals       09/18/17 0845       PT Short Term Goals    STG Date to Achieve 10/09/17  -MM     STG 1 Client will be independent with home program to maximize overall mobility and minimize pain.  -MM     STG 1 Progress New  -MM     STG 2 Client will report 50% improvement in right shoulder pain.  -MM     STG 2 Progress New  -MM     STG 3 Right shoulder active range of motion flexion will improve to 90° or better.  -MM     STG 3 Progress New  -MM     STG 4 Right anterior shoulder tenderness will improve to minimal.  -MM     STG 4 Progress New  -MM     Long Term Goals    LTG Date to Achieve 10/16/17  -MM     LTG 1 QuickDASH score will improve to 50% disability or better.  -MM     LTG 1 Progress New  -MM     LTG 2 Bilateral shoulder active range of motion flexion will improve to 120° or better.  -MM     LTG 2 Progress New  -MM     LTG 3 At mid range bilateral shoulder strength will improve to 4/5.  -MM     Time Calculation    PT Goal Re-Cert Due Date 12/17/17  -MM       User Key  (r) = Recorded By, (t) = Taken By, (c) = Cosigned By    Initials Name Provider Type    DANIEL Melvin, PT Physical Therapist                PT Assessment/Plan       09/18/17 0845       PT Assessment    Functional Limitations Limitations in community activities;Limitation in home management;Performance in leisure activities;Performance in self-care ADL  -MM     Impairments Muscle strength;Pain;Range of motion;Sensation  -MM     Assessment Comments Client presents with evolving symptoms of moderate complexity.  Signs and symptoms are consistent with bilateral shoulder pain, right greater than left.   She is signs of rotator cuff tendinopathy and joint capsule tightness.  She has significant limited mobility of the right shoulder.  She also has a lot of pain with movement.  Left shoulder mobility is also limited.  She was unable to tolerate strength testing for the right shoulder.  Left shoulder strength is also poor.  Client speaks English, but primary language is [place].  Skilled intervention is required to minimize pain and maximize overall function.  -MM     Please refer to paper survey for additional self-reported information Yes  -MM     Rehab Potential Good  -MM     Patient/caregiver participated in establishment of treatment plan and goals Yes  -MM     Patient would benefit from skilled therapy intervention Yes  -MM     PT Plan    PT Frequency 2x/week  -MM     Predicted Duration of Therapy Intervention (days/wks) 6-8 visits  -MM     Planned CPT's? PT EVAL MOD COMPLELITY: 62063;PT THER PROC EA 15 MIN: 43607;PT MANUAL THERAPY EA 15 MIN: 64909;PT HOT OR COLD PACK TREAT MCARE;PT ULTRASOUND EA 15 MIN: 69001  -MM     PT Plan Comments Continue per plan of treatment with exercises to maximize range of motion and begin scapular stabilization.  Also included manual therapy.  -MM       User Key  (r) = Recorded By, (t) = Taken By, (c) = Cosigned By    Initials Name Provider Type    MM Mickey Melvin, PT Physical Therapist                                    Outcome Measures       09/18/17 0845          Quick DASH    Open a tight or new jar. 4  -MM      Do heavy household chores (e.g., wash walls, wash floors) 4  -MM      Carry a shopping bag or briefcase 4  -MM      Wash your back 4  -MM      Use a knife to cut food 4  -MM      Recreational activities in which you take some force or impact through your arm, should or hand (e.g. golf, hammering, tennis, etc.) 4  -MM      During the past week, to what extent has your arm, shoulder, or hand problem interfered with your normal social activites with family, friends,  neighbors or groups? 4  -MM      During the past week, were you limited in your work or other regular daily activities as a result of your arm, shoulder or hand problem? 4  -MM      Arm, Shoulder, or hand pain 4  -MM      Tingling (pins and needles) in your arm, shoulder, or hand 4  -MM      During the past week, how much difficulty have you had sleeping because of the pain in your arm, shoulder or hand? 4  -MM      Number of Questions Answered 11  -MM      Quick DASH Score 75  -MM      Functional Assessment    Outcome Measure Options Quick DASH  -MM        User Key  (r) = Recorded By, (t) = Taken By, (c) = Cosigned By    Initials Name Provider Type    MM Mickey Melvin, PT Physical Therapist            Time Calculation:   Start Time: 0845     Therapy Charges for Today     Code Description Service Date Service Provider Modifiers Qty    30512191796 HC PT CARRY MOV HAND OBJ CURRENT 9/18/2017 Mickey Melvin, PT GP, CL 1    [id number]  PT CARRY MOV HAND OBJ PROJECTED 9/18/2017 Mickey Melvin PT GP, CJ 1    [id number]  PT EVAL MOD COMPLEXITY 4 9/18/2017 Mickey Melvin, PT GP 1          PT G-Codes  PT Professional Judgement Used?: Yes  Outcome Measure Options: Quick DASH  Score: 75%  Functional Limitation: Carrying, moving and handling objects  Carrying, Moving and Handling Objects Current Status (): At least 60 percent but less than 80 percent impaired, limited or restricted  Carrying, Moving and Handling Objects Goal Status (): At least 20 percent but less than 40 percent impaired, limited or restricted         Mickey Melvin, PT  9/18/2017

## 2017-09-22 ENCOUNTER — HOSPITAL ENCOUNTER (OUTPATIENT)
Dept: PHYSICAL THERAPY | Facility: HOSPITAL | Age: 77
Setting detail: THERAPIES SERIES
Discharge: HOME | End: 2017-09-22

## 2017-09-22 DIAGNOSIS — M25.511 CHRONIC PAIN OF BOTH SHOULDERS: Primary | ICD-10-CM

## 2017-09-22 DIAGNOSIS — G89.29 CHRONIC PAIN OF BOTH SHOULDERS: Primary | ICD-10-CM

## 2017-09-22 DIAGNOSIS — M67.911 DISORDER OF RIGHT ROTATOR CUFF: ICD-10-CM

## 2017-09-22 DIAGNOSIS — M25.512 CHRONIC PAIN OF BOTH SHOULDERS: Primary | ICD-10-CM

## 2017-09-22 PROCEDURE — 97140 MANUAL THERAPY 1/> REGIONS: CPT

## 2017-09-22 PROCEDURE — 97110 THERAPEUTIC EXERCISES: CPT

## 2017-09-22 NOTE — THERAPY TREATMENT NOTE
Outpatient Physical Therapy Ortho Treatment Note  [facility]     Patient Name: Julia Blackmon  : 1940  MRN: [id number]  Today's Date: 2017      Visit Date: 2017    Visit Dx:    ICD-10-CM ICD-9-CM   1. Chronic pain of both shoulders M25.512 719.41    G89.29 338.29    M25.511    2. Disorder of right rotator cuff M67.911 726.10       Patient Active Problem List   Diagnosis   • Well woman exam with routine gynecological exam        Past Medical History:   Diagnosis Date   • Arthritis    • Coronary artery disease    • Depression    • Diabetes mellitus    • History of chronic back pain         Past Surgical History:   Procedure Laterality Date   • CHOLECYSTECTOMY     • EYE SURGERY     • HYSTERECTOMY     • PACEMAKER IMPLANTATION                               PT Assessment/Plan       17       PT Assessment    Assessment Comments Client reports some relief with manual therapy.  Range of motion exercises continue to be difficult but she demonstrated a little better range performing them the clinic today.  -MM     PT Plan    PT Plan Comments Continue per plan of treatment with manual therapy and exercises.  -MM       User Key  (r) = Recorded By, (t) = Taken By, (c) = Cosigned By    Initials Name Provider Type    MM Mickey Melvin, PT Physical Therapist                    Exercises       17          Subjective Comments    Subjective Comments Client reports that she continues with quite a bit of shoulder pain and difficulty with range of motion.  She also reports mid to lower neck discomfort.  -MM      Subjective Pain    Able to rate subjective pain? yes  -MM      Pre-Treatment Pain Level 8  -MM      Post-Treatment Pain Level 8  -MM      Exercise 1    Exercise Name 1 Included exercises in clinical setting per flow sheet to maximize range of motion.  -MM      Cueing 1 Verbal;Tactile;Demo  -MM      Time (Minutes) 1 15  -MM      Additional Comments Therapeutic exercise  -MM         User Key  (r) = Recorded By, (t) = Taken By, (c) = Cosigned By    Initials Name Provider Type    DANIEL Melvin PT Physical Therapist                        Manual Rx (last 36 hours)      Manual Treatments       09/22/17 0815          Manual Rx 1    Manual Rx 1 Location Included dry needling for bilateral upper trap.  Also included ASTYM and soft tissue mobilization for bilateral upper trap in both shoulders.  She has tightness and tenderness noted trap region.  She also has quite a bit of anterior shoulder tenderness.  -MM      Manual Rx 1 Duration 30  -MM        User Key  (r) = Recorded By, (t) = Taken By, (c) = Cosigned By    Initials Name Provider Type    DANIEL Melvin PT Physical Therapist                        Time Calculation:   Start Time: 0815  Total Timed Code Minutes- PT: 45 minute(s)    Therapy Charges for Today     Code Description Service Date Service Provider Modifiers Qty    41587294499  PT MANUAL THERAPY EA 15 MIN 9/22/2017 Mickey Melvin, PT GP 2    [id number]  PT THER PROC EA 15 MIN 9/22/2017 Mickey Melvin PT GP 1                    Mickey Melvin, PT  9/22/2017

## 2017-09-25 ENCOUNTER — HOSPITAL ENCOUNTER (OUTPATIENT)
Dept: PHYSICAL THERAPY | Facility: HOSPITAL | Age: 77
Setting detail: THERAPIES SERIES
Discharge: HOME | End: 2017-09-25

## 2017-09-25 DIAGNOSIS — G89.29 CHRONIC PAIN OF BOTH SHOULDERS: Primary | ICD-10-CM

## 2017-09-25 DIAGNOSIS — M25.512 CHRONIC PAIN OF BOTH SHOULDERS: Primary | ICD-10-CM

## 2017-09-25 DIAGNOSIS — M67.911 DISORDER OF RIGHT ROTATOR CUFF: ICD-10-CM

## 2017-09-25 DIAGNOSIS — M25.511 CHRONIC PAIN OF BOTH SHOULDERS: Primary | ICD-10-CM

## 2017-09-25 PROCEDURE — 97140 MANUAL THERAPY 1/> REGIONS: CPT

## 2017-09-25 PROCEDURE — 97110 THERAPEUTIC EXERCISES: CPT

## 2017-09-25 NOTE — THERAPY TREATMENT NOTE
Outpatient Physical Therapy Ortho Treatment Note  [facility]     Patient Name: Julia lBackmon  : 1940  MRN: [id number]  Today's Date: 2017      Visit Date: 2017    Visit Dx:    ICD-10-CM ICD-9-CM   1. Chronic pain of both shoulders M25.512 719.41    G89.29 338.29    M25.511    2. Disorder of right rotator cuff M67.911 726.10       Patient Active Problem List   Diagnosis   • Well woman exam with routine gynecological exam        Past Medical History:   Diagnosis Date   • Arthritis    • Coronary artery disease    • Depression    • Diabetes mellitus    • History of chronic back pain         Past Surgical History:   Procedure Laterality Date   • CHOLECYSTECTOMY     • EYE SURGERY     • HYSTERECTOMY     • PACEMAKER IMPLANTATION                   PT Assessment/Plan       17 0845       PT Assessment    Assessment Comments Client is making some progress with ROM. She continues with quite a bit of pain. No complaints with manual therapy.  -MM     PT Plan    PT Plan Comments Continue per plan of treatment.  -MM       User Key  (r) = Recorded By, (t) = Taken By, (c) = Cosigned By    Initials Name Provider Type    DANIEL Melvin, PT Physical Therapist                    Exercises       17 0845          Subjective Comments    Subjective Comments Client reports severe shoulder pain today at 8/10.  -MM      Subjective Pain    Able to rate subjective pain? yes  -MM      Pre-Treatment Pain Level 8  -MM      Post-Treatment Pain Level 8  -MM      Exercise 1    Exercise Name 1 Included exercises in clinical setting per flow sheet.  -MM      Cueing 1 Verbal  -MM      Time (Minutes) 1 15  -MM      Additional Comments ther ex  -MM        User Key  (r) = Recorded By, (t) = Taken By, (c) = Cosigned By    Initials Name Provider Type    DANIEL Melvin, PT Physical Therapist                        Manual Rx (last 36 hours)      Manual Treatments       17 0845          Manual Rx 1    Manual Rx  1 Location Included dry needling for bilateral upper trap.  Also included ASTYM and soft tissue mobilization for bilateral upper trap in both shoulders.  She has tightness and tenderness noted trap region.  She also has quite a bit of anterior shoulder tenderness.  -MM      Manual Rx 1 Duration 30  -MM        User Key  (r) = Recorded By, (t) = Taken By, (c) = Cosigned By    Initials Name Provider Type    MM Mickey Melvin, PT Physical Therapist                        Time Calculation:   Start Time: 0845  Total Timed Code Minutes- PT: 45 minute(s)    Therapy Charges for Today     Code Description Service Date Service Provider Modifiers Qty    36179576650  PT MANUAL THERAPY EA 15 MIN 9/25/2017 Mickey Melvin, PT GP 2    53857114319 HC PT THER PROC EA 15 MIN 9/25/2017 Mickey Melvin, PT GP 1                    Mickey Melvin, PT  9/25/2017

## 2017-09-29 ENCOUNTER — HOSPITAL ENCOUNTER (OUTPATIENT)
Dept: PHYSICAL THERAPY | Facility: HOSPITAL | Age: 77
Setting detail: THERAPIES SERIES
Discharge: HOME | End: 2017-09-29

## 2017-09-29 DIAGNOSIS — M67.911 DISORDER OF RIGHT ROTATOR CUFF: ICD-10-CM

## 2017-09-29 DIAGNOSIS — M25.512 CHRONIC PAIN OF BOTH SHOULDERS: Primary | ICD-10-CM

## 2017-09-29 DIAGNOSIS — M25.511 CHRONIC PAIN OF BOTH SHOULDERS: Primary | ICD-10-CM

## 2017-09-29 DIAGNOSIS — G89.29 CHRONIC PAIN OF BOTH SHOULDERS: Primary | ICD-10-CM

## 2017-09-29 PROCEDURE — 97110 THERAPEUTIC EXERCISES: CPT

## 2017-09-29 PROCEDURE — 97140 MANUAL THERAPY 1/> REGIONS: CPT

## 2017-09-29 NOTE — THERAPY TREATMENT NOTE
Outpatient Physical Therapy Ortho Treatment Note  [facility]     Patient Name: Julia Blackmon  : 1940  MRN: [id number]  Today's Date: 2017      Visit Date: 2017    Visit Dx:    ICD-10-CM ICD-9-CM   1. Chronic pain of both shoulders M25.512 719.41    G89.29 338.29    M25.511    2. Disorder of right rotator cuff M67.911 726.10       Patient Active Problem List   Diagnosis   • Well woman exam with routine gynecological exam        Past Medical History:   Diagnosis Date   • Arthritis    • Coronary artery disease    • Depression    • Diabetes mellitus    • History of chronic back pain         Past Surgical History:   Procedure Laterality Date   • CHOLECYSTECTOMY     • EYE SURGERY     • HYSTERECTOMY     • PACEMAKER IMPLANTATION                               PT Assessment/Plan       17 08       PT Assessment    Assessment Comments Client continues with severe discomfort often.  She reports quite a bit of relief with manual therapy.  Range of motion is slowly improving.  -MM     PT Plan    PT Plan Comments Continue per plan of treatment with range of motion movement exercises.  Continue ASTYM and manual therapy.  -MM       User Key  (r) = Recorded By, (t) = Taken By, (c) = Cosigned By    Initials Name Provider Type    DANIEL Melvin, PT Physical Therapist                    Exercises       17 0800          Subjective Comments    Subjective Comments Client continues with reports of severe shoulder pain.  -MM      Subjective Pain    Able to rate subjective pain? yes  -MM      Pre-Treatment Pain Level 8  -MM      Post-Treatment Pain Level 6  -MM      Exercise 1    Exercise Name 1 Continued exercises in clinical setting per flow sheet.  -MM      Cueing 1 Verbal  -MM      Time (Minutes) 1 15  -MM      Additional Comments Therapeutic exercise  -MM        User Key  (r) = Recorded By, (t) = Taken By, (c) = Cosigned By    Initials Name Provider Type    DANIEL Melvin, PT Physical  Therapist                        Manual Rx (last 36 hours)      Manual Treatments       09/29/17 0800          Manual Rx 1    Manual Rx 1 Location Included dry needling for bilateral upper trap.  Also included ASTYM and soft tissue mobilization for bilateral upper trap in both shoulders.  She has tightness and tenderness noted trap region.  She also has quite a bit of anterior shoulder tenderness.  Also included passive stretching for bilateral upper trap.  -MM      Manual Rx 1 Duration 30  -MM        User Key  (r) = Recorded By, (t) = Taken By, (c) = Cosigned By    Initials Name Provider Type    MM Mickey Melvin, PT Physical Therapist                        Time Calculation:   Start Time: 0800  Total Timed Code Minutes- PT: 45 minute(s)    Therapy Charges for Today     Code Description Service Date Service Provider Modifiers Qty    02558509153  PT MANUAL THERAPY EA 15 MIN 9/29/2017 Mickey Melvin, PT GP 2    [id number]  PT THER PROC EA 15 MIN 9/29/2017 Mickey Melvin, PT GP 1                    Mickey Melvin, PT  9/29/2017

## 2017-10-02 ENCOUNTER — HOSPITAL ENCOUNTER (OUTPATIENT)
Dept: PHYSICAL THERAPY | Facility: HOSPITAL | Age: 77
Setting detail: THERAPIES SERIES
Discharge: HOME | End: 2017-10-02

## 2017-10-02 DIAGNOSIS — M25.512 CHRONIC PAIN OF BOTH SHOULDERS: Primary | ICD-10-CM

## 2017-10-02 DIAGNOSIS — M67.911 DISORDER OF RIGHT ROTATOR CUFF: ICD-10-CM

## 2017-10-02 DIAGNOSIS — G89.29 CHRONIC PAIN OF BOTH SHOULDERS: Primary | ICD-10-CM

## 2017-10-02 DIAGNOSIS — M25.511 CHRONIC PAIN OF BOTH SHOULDERS: Primary | ICD-10-CM

## 2017-10-02 PROCEDURE — 97140 MANUAL THERAPY 1/> REGIONS: CPT

## 2017-10-02 PROCEDURE — 97110 THERAPEUTIC EXERCISES: CPT

## 2017-10-02 NOTE — THERAPY TREATMENT NOTE
Outpatient Physical Therapy Ortho Treatment Note  [facility]     Patient Name: Julia Blackmon  : 1940  MRN: [id number]  Today's Date: 10/2/2017      Visit Date: 10/02/2017    Visit Dx:    ICD-10-CM ICD-9-CM   1. Chronic pain of both shoulders M25.511 719.41    G89.29 338.29    M25.512    2. Disorder of right rotator cuff M67.911 726.10       Patient Active Problem List   Diagnosis   • Well woman exam with routine gynecological exam        Past Medical History:   Diagnosis Date   • Arthritis    • Coronary artery disease    • Depression    • Diabetes mellitus    • History of chronic back pain         Past Surgical History:   Procedure Laterality Date   • CHOLECYSTECTOMY     • EYE SURGERY     • HYSTERECTOMY     • PACEMAKER IMPLANTATION                 PT Assessment/Plan       10/02/17 0840       PT Assessment    Assessment Comments Slow progress with ROM. Some improvement in pain after manual therapy.  -MM     PT Plan    PT Plan Comments Continue per plan of treatment with manual therapy and exercises.   -MM       User Key  (r) = Recorded By, (t) = Taken By, (c) = Cosigned By    Initials Name Provider Type    DANIEL Melvin, PT Physical Therapist                    Exercises       10/02/17 0840          Subjective Comments    Subjective Comments Client reports that she continues to have a lot of shoulder pain.  -MM      Subjective Pain    Able to rate subjective pain? yes  -MM      Pre-Treatment Pain Level 8  -MM      Post-Treatment Pain Level 6  -MM      Exercise 1    Exercise Name 1 Continued exercises in clinical setting per flow sheet.  -MM      Cueing 1 Verbal  -MM      Time (Minutes) 1 15  -MM      Additional Comments ther ex  -MM        User Key  (r) = Recorded By, (t) = Taken By, (c) = Cosigned By    Initials Name Provider Type    DANIEL Melvin, PT Physical Therapist             Manual Rx (last 36 hours)      Manual Treatments       10/02/17 0840          Manual Rx 1    Manual Rx 1  Location Included dry needling for bilateral upper trap.  Also included ASTYM and soft tissue mobilization for bilateral upper trap in both shoulders.  She has tightness and tenderness noted trap region.  She also has quite a bit of anterior shoulder tenderness.  Also included passive stretching for bilateral upper trap.  -MM      Manual Rx 1 Duration 30  -MM        User Key  (r) = Recorded By, (t) = Taken By, (c) = Cosigned By    Initials Name Provider Type    MM Mickey Melvin, PT Physical Therapist          Time Calculation:   Start Time: 0840  Total Timed Code Minutes- PT: 45 minute(s)    Therapy Charges for Today     Code Description Service Date Service Provider Modifiers Qty    20155036643  PT MANUAL THERAPY EA 15 MIN 10/2/2017 Mickey Melvin, PT GP 2    34254495093 HC PT THER PROC EA 15 MIN 10/2/2017 Mickey Melvin, PT GP 1                    Mickey Melvin, PT  10/2/2017

## 2017-10-06 ENCOUNTER — HOSPITAL ENCOUNTER (OUTPATIENT)
Dept: PHYSICAL THERAPY | Facility: HOSPITAL | Age: 77
Setting detail: THERAPIES SERIES
Discharge: HOME | End: 2017-10-06

## 2017-10-06 DIAGNOSIS — G89.29 CHRONIC PAIN OF BOTH SHOULDERS: Primary | ICD-10-CM

## 2017-10-06 DIAGNOSIS — M67.911 DISORDER OF RIGHT ROTATOR CUFF: ICD-10-CM

## 2017-10-06 DIAGNOSIS — M25.512 CHRONIC PAIN OF BOTH SHOULDERS: Primary | ICD-10-CM

## 2017-10-06 DIAGNOSIS — M25.511 CHRONIC PAIN OF BOTH SHOULDERS: Primary | ICD-10-CM

## 2017-10-06 PROCEDURE — G8986 CARRY D/C STATUS: HCPCS

## 2017-10-06 PROCEDURE — G8985 CARRY GOAL STATUS: HCPCS

## 2017-10-06 PROCEDURE — 97140 MANUAL THERAPY 1/> REGIONS: CPT

## 2017-10-06 PROCEDURE — G8984 CARRY CURRENT STATUS: HCPCS

## 2017-10-06 PROCEDURE — 97110 THERAPEUTIC EXERCISES: CPT

## 2017-10-06 NOTE — THERAPY DISCHARGE NOTE
Outpatient Physical Therapy Ortho Progress Note/Discharge Summary   [place]     Patient Name: Julia Blackmon  : 1940  MRN: [id number]  Today's Date: 10/6/2017      Visit Date: 10/06/2017    Visit Dx:    ICD-10-CM ICD-9-CM   1. Chronic pain of both shoulders M25.511 719.41    G89.29 338.29    M25.512    2. Disorder of right rotator cuff M67.911 726.10       Patient Active Problem List   Diagnosis   • Well woman exam with routine gynecological exam        Past Medical History:   Diagnosis Date   • Arthritis    • Coronary artery disease    • Depression    • Diabetes mellitus    • History of chronic back pain         Past Surgical History:   Procedure Laterality Date   • CHOLECYSTECTOMY     • EYE SURGERY     • HYSTERECTOMY     • PACEMAKER IMPLANTATION               PT Ortho       10/06/17 0900    Left Shoulder    Flexion AROM Deficit 93  -MM    Extension AROM Deficit 40  -MM    ABduction AROM Deficit 85  -MM    External Rotation PROM Deficit 62  -MM    Internal Rotation AROM Deficit T7  -MM    Right Shoulder    Flexion AROM Deficit 58  -MM    Extension AROM Deficit 40  -MM    ABduction AROM Deficit 60  -MM    External Rotation AROM Deficit 25  -MM    Internal Rotation AROM Deficit T10  -MM      User Key  (r) = Recorded By, (t) = Taken By, (c) = Cosigned By    Initials Name Provider Type    DANIEL Melvin, PT Physical Therapist                PT Assessment/Plan       10/06/17 0845       PT Assessment    Assessment Comments Client is being discharged due to lack of progress.  She has difficulty performing range of motion exercises due to pain.  She reportedly has tried some range of motion exercises at home.  Tolerance for activity is limited.  She does have a fairly good understanding of exercises she can continue to maximize range of motion and strength.  Client continues to have quite a bit of pain and limited shoulder mobility.  On recheck today objective measures were not significantly  different.  -MM     PT Plan    PT Plan Comments Discharge due to lack of progress.  Client like to return to her physician because she continues to have quite a bit of pain.  -MM       User Key  (r) = Recorded By, (t) = Taken By, (c) = Cosigned By    Initials Name Provider Type    DANIEL Melvin, PT Physical Therapist                  Exercises       10/06/17 0845          Subjective Comments    Subjective Comments Client reports she continues to have severe shoulder pain.  She also has a lot of neck pain.  She feels that some of her shoulder pain is coming from her neck.  -MM      Subjective Pain    Able to rate subjective pain? yes  -MM      Pre-Treatment Pain Level 9  -MM      Post-Treatment Pain Level 8  -MM      Exercise 1    Exercise Name 1 Reviewed home program to maximize overall mobility and strength.  -MM      Cueing 1 Verbal  -MM      Time (Minutes) 1 15  -MM      Additional Comments Therapeutic exercise  -MM        User Key  (r) = Recorded By, (t) = Taken By, (c) = Cosigned By    Initials Name Provider Type    DANIEL Melvin, PT Physical Therapist                        Manual Rx (last 36 hours)      Manual Treatments       10/06/17 0845          Manual Rx 1    Manual Rx 1 Location Included dry needling for bilateral upper trap.  Also included ASTYM and soft tissue mobilization for bilateral upper trap in both shoulders.  She has tightness and tenderness noted trap region.  She also has quite a bit of anterior shoulder tenderness.  Also included passive stretching for bilateral upper trap.  -MM      Manual Rx 1 Duration 30  -MM        User Key  (r) = Recorded By, (t) = Taken By, (c) = Cosigned By    Initials Name Provider Type    DANIEL Melvin, PT Physical Therapist                PT OP Goals       10/06/17 0845       PT Short Term Goals    STG Date to Achieve 10/09/17  -MM     STG 1 Client will be independent with home program to maximize overall mobility and minimize pain.  -MM     STG  1 Progress Met  -MM     STG 2 Client will report 50% improvement in right shoulder pain.  -MM     STG 2 Progress Not Met  -MM     STG 3 Right shoulder active range of motion flexion will improve to 90° or better.  -MM     STG 3 Progress Not Met  -MM     STG 4 Right anterior shoulder tenderness will improve to minimal.  -MM     STG 4 Progress Not Met  -MM     Long Term Goals    LTG Date to Achieve 10/16/17  -MM     LTG 1 QuickDASH score will improve to 50% disability or better.  -MM     LTG 1 Progress Not Met  -MM     LTG 2 Bilateral shoulder active range of motion flexion will improve to 120° or better.  -MM     LTG 2 Progress Not Met  -MM     LTG 3 At mid range bilateral shoulder strength will improve to 4/5.  -MM     LTG 3 Progress Not Met  -MM       User Key  (r) = Recorded By, (t) = Taken By, (c) = Cosigned By    Initials Name Provider Type    MM Mickey Melvin, PT Physical Therapist          Time Calculation:   Start Time: 0845  Total Timed Code Minutes- PT: 45 minute(s)    Therapy Charges for Today     Code Description Service Date Service Provider Modifiers Qty    00763010033 HC PT CARRY MOV HAND OBJ CURRENT 10/6/2017 Mickey Melvin, PT GP, CL 1    16349125807 HC PT CARRY MOV HAND OBJ PROJECTED 10/6/2017 Mickey Melvin, PT GP, CJ 1    42539655763 HC PT CARRY MOV HAND OBJ DISCHARGE 10/6/2017 Mickey Melvin, PT GP, CL 1    78315601828 HC PT MANUAL THERAPY EA 15 MIN 10/6/2017 Mickey Melvin, PT GP 2    30722644639 HC PT THER PROC EA 15 MIN 10/6/2017 Mickey Melvin, PT GP 1          PT G-Codes  PT Professional Judgement Used?: Yes  Functional Limitation: Carrying, moving and handling objects  Carrying, Moving and Handling Objects Current Status (): At least 60 percent but less than 80 percent impaired, limited or restricted  Carrying, Moving and Handling Objects Goal Status (): At least 20 percent but less than 40 percent impaired, limited or restricted  Carrying, Moving and Handling Objects  Discharge Status (): At least 60 percent but less than 80 percent impaired, limited or restricted     OP PT Discharge Summary  Date of Discharge: 10/06/17  Reason for Discharge: Lack of progress  Outcomes Achieved: Refer to plan of care for updates on goals achieved  Discharge Destination: Home with home program      Mickey Melvin, PT  10/6/2017

## 2017-11-02 ENCOUNTER — APPOINTMENT (OUTPATIENT)
Dept: PREADMISSION TESTING | Facility: HOSPITAL | Age: 77
End: 2017-11-02

## 2017-11-02 VITALS — HEIGHT: 60 IN | BODY MASS INDEX: 26.92 KG/M2 | WEIGHT: 137.13 LBS

## 2017-11-02 RX ORDER — LIDOCAINE 50 MG/G
1 PATCH TOPICAL TAKE AS DIRECTED
COMMUNITY

## 2017-11-02 RX ORDER — HYDROCODONE BITARTRATE AND ACETAMINOPHEN 10; 325 MG/1; MG/1
1 TABLET ORAL EVERY 6 HOURS PRN
COMMUNITY
End: 2017-11-09 | Stop reason: HOSPADM

## 2017-11-02 RX ORDER — ASPIRIN 81 MG/1
81 TABLET ORAL DAILY
COMMUNITY

## 2017-11-02 RX ORDER — ESOMEPRAZOLE MAGNESIUM 40 MG/1
40 CAPSULE, DELAYED RELEASE ORAL
COMMUNITY
End: 2022-02-01

## 2017-11-02 RX ORDER — ATORVASTATIN CALCIUM 10 MG/1
10 TABLET, FILM COATED ORAL DAILY
COMMUNITY

## 2017-11-02 RX ORDER — ESTRADIOL 0.1 MG/G
0.5 CREAM VAGINAL TAKE AS DIRECTED
COMMUNITY

## 2017-11-06 RX ORDER — CEFAZOLIN SODIUM 2 G/100ML
2 INJECTION, SOLUTION INTRAVENOUS ONCE
Status: CANCELLED | OUTPATIENT
Start: 2017-11-07

## 2017-11-07 ENCOUNTER — HOSPITAL ENCOUNTER (OUTPATIENT)
Facility: HOSPITAL | Age: 77
Discharge: HOME | End: 2017-11-09
Attending: ORTHOPAEDIC SURGERY | Admitting: ORTHOPAEDIC SURGERY

## 2017-11-07 ENCOUNTER — ANESTHESIA EVENT (OUTPATIENT)
Dept: PERIOP | Facility: HOSPITAL | Age: 77
End: 2017-11-07

## 2017-11-07 ENCOUNTER — ANESTHESIA (OUTPATIENT)
Dept: PERIOP | Facility: HOSPITAL | Age: 77
End: 2017-11-07

## 2017-11-07 DIAGNOSIS — Z74.09 IMPAIRED FUNCTIONAL MOBILITY, BALANCE, GAIT, AND ENDURANCE: ICD-10-CM

## 2017-11-07 DIAGNOSIS — M75.111 INCOMPLETE TEAR OF RIGHT ROTATOR CUFF: Primary | ICD-10-CM

## 2017-11-07 DIAGNOSIS — Z74.09 IMPAIRED MOBILITY AND ADLS: ICD-10-CM

## 2017-11-07 DIAGNOSIS — Z98.890 S/P ARTHROSCOPY OF SHOULDER: ICD-10-CM

## 2017-11-07 DIAGNOSIS — Z78.9 IMPAIRED MOBILITY AND ADLS: ICD-10-CM

## 2017-11-07 PROBLEM — R03.0 ELEVATED BLOOD PRESSURE READING: Status: ACTIVE | Noted: 2017-11-07

## 2017-11-07 PROBLEM — G89.29 CHRONIC BACK PAIN: Status: ACTIVE | Noted: 2017-11-07

## 2017-11-07 PROBLEM — Z79.891 CHRONIC PRESCRIPTION OPIATE USE: Status: ACTIVE | Noted: 2017-11-07

## 2017-11-07 PROBLEM — K59.09 CHRONIC CONSTIPATION: Status: ACTIVE | Noted: 2017-11-07

## 2017-11-07 PROBLEM — F32.A DEPRESSION: Status: ACTIVE | Noted: 2017-11-07

## 2017-11-07 PROBLEM — M75.110 INCOMPLETE ROTATOR CUFF TEAR: Status: ACTIVE | Noted: 2017-11-07

## 2017-11-07 PROBLEM — K21.9 GERD (GASTROESOPHAGEAL REFLUX DISEASE): Status: ACTIVE | Noted: 2017-11-07

## 2017-11-07 PROBLEM — E11.9 CONTROLLED TYPE 2 DIABETES MELLITUS WITHOUT COMPLICATION, WITHOUT LONG-TERM CURRENT USE OF INSULIN (HCC): Status: ACTIVE | Noted: 2017-11-07

## 2017-11-07 PROBLEM — M54.9 CHRONIC BACK PAIN: Status: ACTIVE | Noted: 2017-11-07

## 2017-11-07 LAB
GLUCOSE BLDC GLUCOMTR-MCNC: 138 MG/DL (ref 70–130)
GLUCOSE BLDC GLUCOMTR-MCNC: 161 MG/DL (ref 70–130)
GLUCOSE BLDC GLUCOMTR-MCNC: 169 MG/DL (ref 70–130)
GLUCOSE BLDC GLUCOMTR-MCNC: 200 MG/DL (ref 70–130)

## 2017-11-07 PROCEDURE — 25010000002 PHENYLEPHRINE PER 1 ML: Performed by: NURSE ANESTHETIST, CERTIFIED REGISTERED

## 2017-11-07 PROCEDURE — L3670 SO ACRO/CLAV CAN WEB PRE OTS: HCPCS | Performed by: ORTHOPAEDIC SURGERY

## 2017-11-07 PROCEDURE — 25010000002 EPINEPHRINE PER 0.1 MG: Performed by: ORTHOPAEDIC SURGERY

## 2017-11-07 PROCEDURE — 25010000003 CEFAZOLIN IN DEXTROSE 2-4 GM/100ML-% SOLUTION: Performed by: ORTHOPAEDIC SURGERY

## 2017-11-07 PROCEDURE — 25010000002 PROPOFOL 10 MG/ML EMULSION: Performed by: NURSE ANESTHETIST, CERTIFIED REGISTERED

## 2017-11-07 PROCEDURE — G0378 HOSPITAL OBSERVATION PER HR: HCPCS

## 2017-11-07 PROCEDURE — C1713 ANCHOR/SCREW BN/BN,TIS/BN: HCPCS | Performed by: ORTHOPAEDIC SURGERY

## 2017-11-07 PROCEDURE — 25010000002 FENTANYL CITRATE (PF) 100 MCG/2ML SOLUTION: Performed by: NURSE ANESTHETIST, CERTIFIED REGISTERED

## 2017-11-07 PROCEDURE — 99219 PR INITIAL OBSERVATION CARE/DAY 50 MINUTES: CPT | Performed by: PHYSICIAN ASSISTANT

## 2017-11-07 PROCEDURE — 25010000002 ROPIVACAINE PER 1 MG: Performed by: NURSE ANESTHETIST, CERTIFIED REGISTERED

## 2017-11-07 PROCEDURE — 25010000002 ONDANSETRON PER 1 MG: Performed by: NURSE ANESTHETIST, CERTIFIED REGISTERED

## 2017-11-07 PROCEDURE — 25010000002 DEXAMETHASONE PER 1 MG: Performed by: NURSE ANESTHETIST, CERTIFIED REGISTERED

## 2017-11-07 PROCEDURE — 82962 GLUCOSE BLOOD TEST: CPT

## 2017-11-07 PROCEDURE — 25010000002 PROPOFOL 1000 MG/ML EMULSION: Performed by: NURSE ANESTHETIST, CERTIFIED REGISTERED

## 2017-11-07 PROCEDURE — 25010000002 NEOSTIGMINE PER 0.5 MG: Performed by: NURSE ANESTHETIST, CERTIFIED REGISTERED

## 2017-11-07 DEVICE — SUT/ANCH PUSHLOCK BIOCOMP 3.5X19.5: Type: IMPLANTABLE DEVICE | Site: SHOULDER | Status: FUNCTIONAL

## 2017-11-07 DEVICE — PUNCH PUSHLOCK CORKSCRW FT 4.5MM DISP: Type: IMPLANTABLE DEVICE | Status: FUNCTIONAL

## 2017-11-07 DEVICE — SYS SUT/ANCH BIOCOMP SPEEDBRIDGE SWIVELK 4.75X19.1: Type: IMPLANTABLE DEVICE | Site: SHOULDER | Status: FUNCTIONAL

## 2017-11-07 DEVICE — SUT/ANCH BIOCOMP SWIVELOCK/C 4.75X19.1MM: Type: IMPLANTABLE DEVICE | Site: SHOULDER | Status: FUNCTIONAL

## 2017-11-07 RX ORDER — BUPIVACAINE HYDROCHLORIDE 2.5 MG/ML
INJECTION, SOLUTION EPIDURAL; INFILTRATION; INTRACAUDAL AS NEEDED
Status: DISCONTINUED | OUTPATIENT
Start: 2017-11-07 | End: 2017-11-07 | Stop reason: SURG

## 2017-11-07 RX ORDER — ATORVASTATIN CALCIUM 10 MG/1
10 TABLET, FILM COATED ORAL NIGHTLY
Status: DISCONTINUED | OUTPATIENT
Start: 2017-11-07 | End: 2017-11-09 | Stop reason: HOSPADM

## 2017-11-07 RX ORDER — MAGNESIUM HYDROXIDE 1200 MG/15ML
LIQUID ORAL AS NEEDED
Status: DISCONTINUED | OUTPATIENT
Start: 2017-11-07 | End: 2017-11-07 | Stop reason: HOSPADM

## 2017-11-07 RX ORDER — SENNA AND DOCUSATE SODIUM 50; 8.6 MG/1; MG/1
2 TABLET, FILM COATED ORAL NIGHTLY
Status: DISCONTINUED | OUTPATIENT
Start: 2017-11-07 | End: 2017-11-08

## 2017-11-07 RX ORDER — BUPIVACAINE HYDROCHLORIDE AND EPINEPHRINE 2.5; 5 MG/ML; UG/ML
INJECTION, SOLUTION EPIDURAL; INFILTRATION; INTRACAUDAL; PERINEURAL AS NEEDED
Status: DISCONTINUED | OUTPATIENT
Start: 2017-11-07 | End: 2017-11-07 | Stop reason: HOSPADM

## 2017-11-07 RX ORDER — LIDOCAINE HYDROCHLORIDE 10 MG/ML
0.5 INJECTION, SOLUTION EPIDURAL; INFILTRATION; INTRACAUDAL; PERINEURAL ONCE AS NEEDED
Status: COMPLETED | OUTPATIENT
Start: 2017-11-07 | End: 2017-11-07

## 2017-11-07 RX ORDER — PROMETHAZINE HYDROCHLORIDE 25 MG/1
25 SUPPOSITORY RECTAL ONCE AS NEEDED
Status: DISCONTINUED | OUTPATIENT
Start: 2017-11-07 | End: 2017-11-07 | Stop reason: HOSPADM

## 2017-11-07 RX ORDER — HYDROCODONE BITARTRATE AND ACETAMINOPHEN 10; 325 MG/1; MG/1
1 TABLET ORAL EVERY 6 HOURS PRN
Status: DISCONTINUED | OUTPATIENT
Start: 2017-11-07 | End: 2017-11-09 | Stop reason: HOSPADM

## 2017-11-07 RX ORDER — CEFAZOLIN SODIUM 2 G/100ML
2 INJECTION, SOLUTION INTRAVENOUS ONCE
Status: COMPLETED | OUTPATIENT
Start: 2017-11-07 | End: 2017-11-07

## 2017-11-07 RX ORDER — FENTANYL CITRATE 50 UG/ML
INJECTION, SOLUTION INTRAMUSCULAR; INTRAVENOUS AS NEEDED
Status: DISCONTINUED | OUTPATIENT
Start: 2017-11-07 | End: 2017-11-07 | Stop reason: SURG

## 2017-11-07 RX ORDER — LIDOCAINE HYDROCHLORIDE 10 MG/ML
INJECTION, SOLUTION INFILTRATION; PERINEURAL AS NEEDED
Status: DISCONTINUED | OUTPATIENT
Start: 2017-11-07 | End: 2017-11-07 | Stop reason: SURG

## 2017-11-07 RX ORDER — PROMETHAZINE HYDROCHLORIDE 25 MG/1
25 TABLET ORAL ONCE AS NEEDED
Status: DISCONTINUED | OUTPATIENT
Start: 2017-11-07 | End: 2017-11-07 | Stop reason: HOSPADM

## 2017-11-07 RX ORDER — OXYCODONE AND ACETAMINOPHEN 7.5; 325 MG/1; MG/1
1 TABLET ORAL EVERY 6 HOURS PRN
Status: DISCONTINUED | OUTPATIENT
Start: 2017-11-07 | End: 2017-11-09 | Stop reason: HOSPADM

## 2017-11-07 RX ORDER — ONDANSETRON 2 MG/ML
4 INJECTION INTRAMUSCULAR; INTRAVENOUS ONCE AS NEEDED
Status: DISCONTINUED | OUTPATIENT
Start: 2017-11-07 | End: 2017-11-07 | Stop reason: HOSPADM

## 2017-11-07 RX ORDER — OXYCODONE HYDROCHLORIDE AND ACETAMINOPHEN 5; 325 MG/1; MG/1
1 TABLET ORAL EVERY 4 HOURS PRN
Status: DISCONTINUED | OUTPATIENT
Start: 2017-11-07 | End: 2017-11-07 | Stop reason: SDUPTHER

## 2017-11-07 RX ORDER — ASPIRIN 81 MG/1
81 TABLET ORAL DAILY
Status: DISCONTINUED | OUTPATIENT
Start: 2017-11-08 | End: 2017-11-09 | Stop reason: HOSPADM

## 2017-11-07 RX ORDER — FENTANYL CITRATE 50 UG/ML
50 INJECTION, SOLUTION INTRAMUSCULAR; INTRAVENOUS
Status: DISCONTINUED | OUTPATIENT
Start: 2017-11-07 | End: 2017-11-07 | Stop reason: HOSPADM

## 2017-11-07 RX ORDER — DEXAMETHASONE SODIUM PHOSPHATE 4 MG/ML
INJECTION, SOLUTION INTRA-ARTICULAR; INTRALESIONAL; INTRAMUSCULAR; INTRAVENOUS; SOFT TISSUE AS NEEDED
Status: DISCONTINUED | OUTPATIENT
Start: 2017-11-07 | End: 2017-11-07 | Stop reason: SURG

## 2017-11-07 RX ORDER — EPINEPHRINE 1 MG/ML
INJECTION, SOLUTION, CONCENTRATE INTRAVENOUS AS NEEDED
Status: DISCONTINUED | OUTPATIENT
Start: 2017-11-07 | End: 2017-11-07 | Stop reason: HOSPADM

## 2017-11-07 RX ORDER — SODIUM CHLORIDE, SODIUM LACTATE, POTASSIUM CHLORIDE, AND CALCIUM CHLORIDE .6; .31; .03; .02 G/100ML; G/100ML; G/100ML; G/100ML
IRRIGANT IRRIGATION AS NEEDED
Status: DISCONTINUED | OUTPATIENT
Start: 2017-11-07 | End: 2017-11-07 | Stop reason: HOSPADM

## 2017-11-07 RX ORDER — HYDRALAZINE HYDROCHLORIDE 20 MG/ML
10 INJECTION INTRAMUSCULAR; INTRAVENOUS EVERY 6 HOURS PRN
Status: DISCONTINUED | OUTPATIENT
Start: 2017-11-07 | End: 2017-11-09 | Stop reason: HOSPADM

## 2017-11-07 RX ORDER — PROMETHAZINE HYDROCHLORIDE 25 MG/ML
6.25 INJECTION, SOLUTION INTRAMUSCULAR; INTRAVENOUS ONCE AS NEEDED
Status: DISCONTINUED | OUTPATIENT
Start: 2017-11-07 | End: 2017-11-07 | Stop reason: HOSPADM

## 2017-11-07 RX ORDER — GLYCOPYRROLATE 0.2 MG/ML
INJECTION INTRAMUSCULAR; INTRAVENOUS AS NEEDED
Status: DISCONTINUED | OUTPATIENT
Start: 2017-11-07 | End: 2017-11-07 | Stop reason: SURG

## 2017-11-07 RX ORDER — FAMOTIDINE 10 MG/ML
20 INJECTION, SOLUTION INTRAVENOUS EVERY 12 HOURS SCHEDULED
Status: DISCONTINUED | OUTPATIENT
Start: 2017-11-07 | End: 2017-11-07 | Stop reason: HOSPADM

## 2017-11-07 RX ORDER — PROPOFOL 10 MG/ML
VIAL (ML) INTRAVENOUS AS NEEDED
Status: DISCONTINUED | OUTPATIENT
Start: 2017-11-07 | End: 2017-11-07 | Stop reason: SURG

## 2017-11-07 RX ORDER — PANTOPRAZOLE SODIUM 40 MG/1
40 TABLET, DELAYED RELEASE ORAL EVERY MORNING
Status: DISCONTINUED | OUTPATIENT
Start: 2017-11-08 | End: 2017-11-09 | Stop reason: HOSPADM

## 2017-11-07 RX ORDER — ONDANSETRON 2 MG/ML
INJECTION INTRAMUSCULAR; INTRAVENOUS AS NEEDED
Status: DISCONTINUED | OUTPATIENT
Start: 2017-11-07 | End: 2017-11-07 | Stop reason: SURG

## 2017-11-07 RX ORDER — FAMOTIDINE 20 MG/1
20 TABLET, FILM COATED ORAL EVERY 12 HOURS SCHEDULED
Status: DISCONTINUED | OUTPATIENT
Start: 2017-11-07 | End: 2017-11-07 | Stop reason: HOSPADM

## 2017-11-07 RX ORDER — CEFAZOLIN SODIUM 2 G/100ML
2 INJECTION, SOLUTION INTRAVENOUS EVERY 8 HOURS
Status: COMPLETED | OUTPATIENT
Start: 2017-11-07 | End: 2017-11-08

## 2017-11-07 RX ORDER — ROPIVACAINE HYDROCHLORIDE 2 MG/ML
6 INJECTION, SOLUTION EPIDURAL; INFILTRATION CONTINUOUS
Status: DISCONTINUED | OUTPATIENT
Start: 2017-11-07 | End: 2017-11-09 | Stop reason: HOSPADM

## 2017-11-07 RX ORDER — SODIUM CHLORIDE, SODIUM LACTATE, POTASSIUM CHLORIDE, CALCIUM CHLORIDE 600; 310; 30; 20 MG/100ML; MG/100ML; MG/100ML; MG/100ML
9 INJECTION, SOLUTION INTRAVENOUS CONTINUOUS PRN
Status: DISCONTINUED | OUTPATIENT
Start: 2017-11-07 | End: 2017-11-07 | Stop reason: HOSPADM

## 2017-11-07 RX ORDER — SODIUM CHLORIDE 0.9 % (FLUSH) 0.9 %
1-10 SYRINGE (ML) INJECTION AS NEEDED
Status: DISCONTINUED | OUTPATIENT
Start: 2017-11-07 | End: 2017-11-07 | Stop reason: HOSPADM

## 2017-11-07 RX ORDER — LIDOCAINE 50 MG/G
3 PATCH TOPICAL DAILY
Status: DISCONTINUED | OUTPATIENT
Start: 2017-11-07 | End: 2017-11-09 | Stop reason: HOSPADM

## 2017-11-07 RX ORDER — ATRACURIUM BESYLATE 10 MG/ML
INJECTION, SOLUTION INTRAVENOUS AS NEEDED
Status: DISCONTINUED | OUTPATIENT
Start: 2017-11-07 | End: 2017-11-07 | Stop reason: SURG

## 2017-11-07 RX ADMIN — FAMOTIDINE 20 MG: 20 TABLET, FILM COATED ORAL at 06:20

## 2017-11-07 RX ADMIN — BUPIVACAINE HYDROCHLORIDE 15 ML: 2.5 INJECTION, SOLUTION EPIDURAL; INFILTRATION; INTRACAUDAL; PERINEURAL at 07:20

## 2017-11-07 RX ADMIN — ATORVASTATIN CALCIUM 10 MG: 10 TABLET, FILM COATED ORAL at 20:57

## 2017-11-07 RX ADMIN — PHENYLEPHRINE HYDROCHLORIDE 100 MCG: 10 INJECTION INTRAVENOUS at 07:56

## 2017-11-07 RX ADMIN — ATRACURIUM BESYLATE 35 MG: 10 INJECTION, SOLUTION INTRAVENOUS at 07:39

## 2017-11-07 RX ADMIN — FENTANYL CITRATE 50 MCG: 50 INJECTION INTRAMUSCULAR; INTRAVENOUS at 09:55

## 2017-11-07 RX ADMIN — PROPOFOL 50 MCG/KG/MIN: 10 INJECTION, EMULSION INTRAVENOUS at 07:40

## 2017-11-07 RX ADMIN — METFORMIN HYDROCHLORIDE 500 MG: 500 TABLET, FILM COATED ORAL at 18:03

## 2017-11-07 RX ADMIN — SODIUM CHLORIDE, POTASSIUM CHLORIDE, SODIUM LACTATE AND CALCIUM CHLORIDE 9 ML/HR: 600; 310; 30; 20 INJECTION, SOLUTION INTRAVENOUS at 06:20

## 2017-11-07 RX ADMIN — CEFAZOLIN SODIUM 2 G: 2 INJECTION, SOLUTION INTRAVENOUS at 07:34

## 2017-11-07 RX ADMIN — FENTANYL CITRATE 50 MCG: 50 INJECTION, SOLUTION INTRAMUSCULAR; INTRAVENOUS at 07:06

## 2017-11-07 RX ADMIN — FENTANYL CITRATE 50 MCG: 50 INJECTION INTRAMUSCULAR; INTRAVENOUS at 10:25

## 2017-11-07 RX ADMIN — LIDOCAINE HYDROCHLORIDE 0.5 ML: 10 INJECTION, SOLUTION EPIDURAL; INFILTRATION; INTRACAUDAL; PERINEURAL at 06:20

## 2017-11-07 RX ADMIN — DEXAMETHASONE SODIUM PHOSPHATE 8 MG: 4 INJECTION, SOLUTION INTRAMUSCULAR; INTRAVENOUS at 07:45

## 2017-11-07 RX ADMIN — PHENYLEPHRINE HYDROCHLORIDE 100 MCG: 10 INJECTION INTRAVENOUS at 08:21

## 2017-11-07 RX ADMIN — OXYCODONE AND ACETAMINOPHEN 1 TABLET: 5; 325 TABLET ORAL at 14:21

## 2017-11-07 RX ADMIN — PROPOFOL 100 MG: 10 INJECTION, EMULSION INTRAVENOUS at 07:39

## 2017-11-07 RX ADMIN — ROPIVACAINE HYDROCHLORIDE 6 ML/HR: 2 INJECTION, SOLUTION EPIDURAL; INFILTRATION at 09:58

## 2017-11-07 RX ADMIN — CEFAZOLIN SODIUM 2 G: 2 INJECTION, SOLUTION INTRAVENOUS at 18:03

## 2017-11-07 RX ADMIN — ONDANSETRON 4 MG: 2 INJECTION INTRAMUSCULAR; INTRAVENOUS at 09:05

## 2017-11-07 RX ADMIN — Medication 3 MG: at 09:05

## 2017-11-07 RX ADMIN — PHENYLEPHRINE HYDROCHLORIDE 50 MCG: 10 INJECTION INTRAVENOUS at 08:45

## 2017-11-07 RX ADMIN — LIDOCAINE HYDROCHLORIDE 40 MG: 10 INJECTION, SOLUTION INFILTRATION; PERINEURAL at 07:39

## 2017-11-07 RX ADMIN — GLYCOPYRROLATE 0.4 MG: 0.2 INJECTION, SOLUTION INTRAMUSCULAR; INTRAVENOUS at 09:05

## 2017-11-07 RX ADMIN — Medication 2 TABLET: at 20:57

## 2017-11-07 RX ADMIN — FENTANYL CITRATE 50 MCG: 50 INJECTION, SOLUTION INTRAMUSCULAR; INTRAVENOUS at 07:39

## 2017-11-07 NOTE — ANESTHESIA PREPROCEDURE EVALUATION
Anesthesia Evaluation     Patient summary reviewed and Nursing notes reviewed   no history of anesthetic complications:  NPO Solid Status: > 8 hours  NPO Liquid Status: > 2 hours     Airway   Mallampati: II  TM distance: >3 FB  Neck ROM: full  no difficulty expected  Dental    (+) upper dentures and lower dentures    Pulmonary    (+) decreased breath sounds,   Cardiovascular   Exercise tolerance: good (4-7 METS)    ECG reviewed  Rhythm: regular  Rate: normal    (+) pacemaker pacemaker interrogated <1 month ago, past MI  >12 months, CAD, dysrhythmias Bradycardia,     ROS comment: NORMAL STRESS TEST    Neuro/Psych  (+) psychiatric history Depression,    GI/Hepatic/Renal/Endo    (+)  GERD well controlled, diabetes mellitus type 2,     Musculoskeletal     Abdominal   (+) obese,     Abdomen: soft.   Substance History      OB/GYN          Other   (+) arthritis   history of cancer remission                                  Anesthesia Plan    ASA 3     general and regional     intravenous induction   Anesthetic plan and risks discussed with patient.    Plan discussed with CRNA.

## 2017-11-07 NOTE — ANESTHESIA PROCEDURE NOTES
Airway  Urgency: elective    Date/Time: 11/7/2017 7:41 AM  Airway not difficult    General Information and Staff    Patient location during procedure: OR  CRNA: LEONID MILLS    Indications and Patient Condition  Indications for airway management: airway protection    Preoxygenated: yes  MILS not maintained throughout  Mask difficulty assessment: 1 - vent by mask    Final Airway Details  Final airway type: endotracheal airway      Successful airway: ETT  Cuffed: yes   Successful intubation technique: direct laryngoscopy  Endotracheal tube insertion site: oral  Blade: Anisha  Blade size: #3  ETT size: 6.5 mm  Cormack-Lehane Classification: grade I - full view of glottis  Placement verified by: chest auscultation and capnometry   Measured from: gums  ETT to gums (cm): 20  Number of attempts at approach: 1    Additional Comments  Smooth IV induction.  Atraumatic ET intubation.  Dentition unchanged.  Negative epigastric sounds, Breath sound equal bilaterally with symmetric chest rise and fall

## 2017-11-07 NOTE — BRIEF OP NOTE
SHOULDER ARTHROSCOPY WITH ROTATOR CUFF REPAIR  Progress Note    Julia Blackmon  11/7/2017    Pre-op Diagnosis:   Rotator cuff tear of the right shoulder        Post-Op Diagnosis Codes:   same     Procedure/CPT® Codes:    62098  Procedure(s):  SHOULDER ARTHROSCOPY WITH ROTATOR CUFF REPAIR RIGHT    Surgeon(s):  Hugo Ramos MD    Anesthesia: General with Block    Staff:   Circulator: Mary Moran RN; Rina Barba RN  Scrub Person: Kee Crook  Assistant: RICH Starks    Estimated Blood Loss: 25 mL    Urine Voided: * No values recorded between 11/7/2017  7:34 AM and 11/7/2017  9:09 AM *    Specimens:                None      Drains:           Findings: rotator cuff tear     Complications: none      Hugo Ramos MD     Date: 11/7/2017  Time: 9:09 AM

## 2017-11-07 NOTE — CONSULTS
[facility] Medicine Services  CONSULT NOTE      Patient Name: Julia Blackmon  : 1940  MRN: [id number]    Primary Care Physician: Jewel Jorgensen MD  Referring Provider: Hugo Ramos MD    Subjective     Reason for Consultation: medical management    HPI:  Julia Blackmon is a 77 y.o. female with PMH significant for CAD, non-insulin dependent DMII, GERD chronic pain on chronic opiate therapy, chronic constipation, depression and osteoporosis. Also had PPM placed in  at [place], she can't recall who her cardiologist is now. She was admitted to [facility] on 17 for scheduled right shoulder arthroscopy with rotator cuff repair. No immediate post-operative complications.     When see, her primary concerns are regarding her ability to care for herself at home, how long she needs to wear her sling for and where she will do rehab. She is also concerned about her blood glucose and blood pressure, both of which are higher than usual for her. BP most recently 167/72 and blood glucose 161. Denies chest pain, dyspnea, N/V or operative pain at this time. Hospital medicine consulted to follow for medical management.     Review of Systems  Gen- No fevers, chills  CV- No chest pain, palpitations  Resp- No cough, dyspnea  GI- No N/V/D, abd pain    Otherwise 10-system ROS reviewed and is negative except as mentioned in the HPI.    Past Medical History:   Diagnosis Date   • Allergic rhinitis    • Atrophic vaginitis    • Cancer     skin cancer   • Chronic constipation    • Chronic fatigue    • Chronic low back pain    • Chronic prescription opiate use    • Coronary artery disease    • Depression    • GERD (gastroesophageal reflux disease)    • [place] (hard of hearing)    • Memory change    • Meningioma    • MI, 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   • Non-insulin dependent type 2 diabetes mellitus    • Osteoarthritis    • Osteoporosis    • Pacemaker     home interogation   • Urinary frequency    • Wears dentures     • Wears glasses      Past Surgical History:   Procedure Laterality Date   • CHOLECYSTECTOMY     • EYE SURGERY      catarct extraction   • HYSTERECTOMY     • PACEMAKER IMPLANTATION       Family History: family history includes Lung cancer in her brother; Osteoporosis in her mother.     Social History:  reports that she has never smoked. She has never used smokeless tobacco. She reports that she does not drink alcohol or use illicit drugs.    Medications:  Medication Sig   • Escitalopram Oxalate (LEXAPRO PO) Take  by mouth.   • estradiol (ESTRACE) 0.1 MG/GM vaginal cream Insert 0.5 g into the vagina Take As Directed. Twice weekly   • HYDROcodone-acetaminophen (NORCO)  MG per tablet Take 1 tablet by mouth Every 6 (Six) Hours As Needed for Moderate Pain .   • metFORMIN (GLUCOPHAGE) 500 MG tablet Take 500 mg by mouth 2 (Two) Times a Day With Meals.   • aspirin 81 MG EC tablet Take 81 mg by mouth Daily.   • atorvastatin (LIPITOR) 10 MG tablet Take 10 mg by mouth Daily.   • Calcium Citrate-Vitamin D (CALCIUM + D PO) Take 600 mg by mouth Daily.   • esomeprazole (nexIUM) 40 MG capsule Take 40 mg by mouth Every Morning Before Breakfast.   • lidocaine (LIDODERM) 5 % Place 1 patch on the skin Take As Directed. apply three patches to skin daily and remove for 12 hours     Allergies   Allergen Reactions   • Cortisone Itching     Objective     Temp:  [97 °F (36.1 °C)-98 °F (36.7 °C)] 97.8 °F (36.6 °C)  Heart Rate:  [69-75] 70  Resp:  [16-20] 16  BP: (125-175)/(62-88) 167/72     Physical Exam   Constitutional: No acute distress, awake, alert and conversant.   Eyes: PERRLA, sclerae anicteric, no conjunctival injection  HENT: NCAT, mucous membranes moist. Hard of hearing.   Neck: Supple, no thyromegaly, no lymphadenopathy, trachea midline  Respiratory: Clear to auscultation bilaterally, nonlabored respirations   Cardiovascular: RRR, no murmurs, rubs, or gallops, palpable pedal pulses bilaterally  Gastrointestinal: Positive  bowel sounds, soft, nontender, nondistended  Musculoskeletal: No bilateral ankle edema, no clubbing or cyanosis to extremities. Right shoulder in sling with surgical dressing in place, not removed for exam. Appears c/d/i without overt erythema or edema.   Psychiatric: Anxious affect, cooperative  Neurologic: Oriented x 3, strength symmetric in all extremities, Cranial Nerves grossly intact to confrontation, speech clear  Skin: No rashes    Results Reviewed:  I have personally reviewed current lab, radiology, and data and agree.    Imaging Results (last 24 hours)     ** No results found for the last 24 hours. **        Assessment / Plan     Hospital Problem List     * (Principal)Incomplete rotator cuff tear    Elevated blood pressure reading    Chronic prescription opiate use    Depression    GERD (gastroesophageal reflux disease)    Controlled type 2 diabetes mellitus without complication, without long-term current use of insulin    Chronic constipation    Chronic back pain        Ms. Julia Blackmon is a 78yo female admitted to [facility] on 11/7/17 for scheduled right shoulder arthroscopy with rotator cuff repair. No immediate post-operative complications. Hospital medicine following for medical management.     Incomplete R rotator cuff tear s/p arthroscopy with RCR repair   - By Dr. Hugo Ramos on 11/7/17- post-surgical care per surgeon  - Anesthesia following nerve catheter  - PT/OT while in house    Elevated blood pressure  - With no history of HTN, will monitor BP overnight and add PRN for SBP > 180     Non-insulin dependent DMII  - Okay to resume home metformin  - ACHS accuchecks, will check AM A1c     Chronic pain/chronic prescription opiate use  - At baseline, uses Norco 10mg up to four times per day for chronic back pain.   - Will make pain management more challenging.   - Resume home pain medications with the addition of PRN Oxycodone     CAD, hold home ASA tonight. May resume in AM   Chronic constipation, uses  Miralax daily at baseline. Aggressive bowel regimen   Depression, continue home meds  GERD, PPI     Thank you for allowing [facility] Medicine Service to provide consultative care for your patient, we will continue to follow while clinically appropriate.    Yani Vasquez PA-C   11/07/17   3:18 PM

## 2017-11-07 NOTE — INTERVAL H&P NOTE
[facility] Pre-op    Full history and physical note from office reviewed and updated.  See office note attached.    /62 (BP Location: Right arm, Patient Position: Lying)  Pulse 70  Temp 98 °F (36.7 °C) (Temporal Artery )   Resp 16  SpO2 97%    Physical Exam:     Cardiovascular - Regular rate and rhythm. Normal s1/s2. No heaves, rubs, gallops or murmurs.      Respiratory - Lungs clear to ascultation bilaterally. Unlabored respirations. No wheezes, crackles or rales.     IMM:  Influenza:  denies  Pneumococcal:  denies  Tetanus:  denies    Cancer Staging (if applicable)  Cancer Patient: __ yes _x_no __unknown__N/A; If yes, clinical stage T:__ N:__M:__, stage group or __N/A    RICH Mendenhall 11/7/2017 7:03 AM

## 2017-11-07 NOTE — BRIEF OP NOTE
SHOULDER ARTHROSCOPY WITH ROTATOR CUFF REPAIR  Progress Note    Julia Blacmkon  11/7/2017    Pre-op Diagnosis:   Right rotator cuff tear         Post-Op Diagnosis Codes:   same    Procedure/CPT® Codes:    81833  Procedure(s):  SHOULDER ARTHROSCOPY WITH ROTATOR CUFF REPAIR RIGHT    Surgeon(s):  Hugo Ramos MD    Anesthesia: General with Block    Staff:   Circulator: Mary Moran RN; Rina Barba RN  Scrub Person: Kee Crook  Assistant: RICH Starks    Estimated Blood Loss: 25 mL    Urine Voided: * No values recorded between 11/7/2017  7:34 AM and 11/7/2017  9:21 AM *    Specimens:                None      Drains:           Findings: rotator cuff tear    Complications: none      Hugo Ramos MD     Date: 11/7/2017  Time: 10:03 AM

## 2017-11-07 NOTE — ANESTHESIA POSTPROCEDURE EVALUATION
Patient: Julia Blackmon    Procedure Summary     Date Anesthesia Start Anesthesia Stop Room / Location    11/07/17 0734 0929 [facility] / [facility]       Procedure Diagnosis Surgeon Provider    SHOULDER ARTHROSCOPY WITH ROTATOR CUFF REPAIR RIGHT (Right Shoulder) No diagnosis on file. MD Jordi Nicolas MD          Anesthesia Type: general, regional  Last vitals  BP   154/73 (11/07/17 0928)   Temp   97 °F (36.1 °C) (11/07/17 0928)   Pulse   75 (11/07/17 0928)   Resp   20 (11/07/17 0928)     SpO2   95 % (11/07/17 0928)     Post Anesthesia Care and Evaluation    Patient location during evaluation: PACU  Patient participation: complete - patient participated  Level of consciousness: awake and alert  Pain management: adequate  Airway patency: patent  Anesthetic complications: No anesthetic complications  PONV Status: none  Cardiovascular status: hemodynamically stable and acceptable  Respiratory status: nonlabored ventilation, acceptable and nasal cannula  Hydration status: acceptable    Comments: Pt transported to PACU with O2 via nasal cannula at 4 L/MIN. Vital signs stable.  Pt shows no signs of distress.  Report given to PACU RN. /73 (BP Location: Right leg)  Pulse 75  Temp 97 °F (36.1 °C) (Tympanic)   Resp 20  SpO2 95%

## 2017-11-07 NOTE — OP NOTE
Pre/Post Procedure Diagnosis: rotator cuff tear of the right shoulder  Procedure: arthroscopy right shoulder with rotator cuff repair  Surgeon: Hugo Ramos MD  Assistant: Hugo HADLEY  Anesthesia: General endotracheal with interscalene   Complications: none known  Indications:Patient has had persistent unrelenting painful rotator cuff tear despite appropriate non operative treatment. After thorough discussion of the risks and alternatives to rotator cuff repair, patient requested to procedure with surgery. She had preoperative medical and cardiology clearance.  Technique and findings: After successful and uneventful general anesthesia was obtained, patient was placed in left lateral decubitus position with axillary roll and beanbag. There was no pathologic laxity or contracture of the right shoulder and the right shoulder was prepped and draped in sterile fashion. The arm was placed in balanced suspension traction and standard arthroscopy portals were obtained. The articular surfaces, biceps origin, labrum and subscapularis were intact. There was full thickness tear of the posterior superior rotator cuff tear. The arthroscope was placed in a posterior lateral portal wiith an anterolateral cannula for suture management. The greater tuberosity and rotator cuff tear wear abraded with the shaver and a speed bridge construct was used to repair the rotator cuff tear. Sutures/tapes were passed with accupass and a medial row was tied over the tendon. A lateral tape was placed in inverted mattress fashion and was used to secure the lateral edge of the tendon to the greater tuberosity with a pushlock suture anchor. The speed bridge was completed using 2 lateral row swivel lock suture anchors. The posterior anchor did not achieve adequate purchase and pulled out. The anchor was exchanged for another swivel lock suture anchor and the repair was completed with the anchor placed more posteriorly with good fixation and  tension for the repair. There was excellent repair of the rotator cuff and the arthroscopy instruments were removed from the shoulder and portals were closed with nylon sutures, a bulky sterile dressing was applied as well as a shoulder immobilizer. The patient was reversed from anethesia, extubated and taken to recovery room in stable condition for admission.

## 2017-11-07 NOTE — ANESTHESIA PROCEDURE NOTES
Peripheral Block    Patient location during procedure: pre-op  Start time: 11/7/2017 7:08 AM  Stop time: 11/7/2017 7:22 AM  Reason for block: at surgeon's request and post-op pain management  Performed by  CRNA: LEONID MILLS  Assisted by: AMAYA PUCKETT  Preanesthetic Checklist  Completed: patient identified, site marked, surgical consent, pre-op evaluation, timeout performed, IV checked, risks and benefits discussed and monitors and equipment checked  Prep:  Pt Position: left lateral decubitus  Sterile barriers:cap, gloves, mask and sterile barriers  Prep: ChloraPrep  Patient monitoring: blood pressure monitoring, continuous pulse oximetry and EKG  Procedure  Sedation:yes    Guidance:ultrasound guided  ULTRASOUND INTERPRETATION. Using ultrasound guidance a gauge needle was placed in close proximity to the brachial plexus nerve, at which point, under ultrasound guidance anesthetic was injected in the area of the nerve and spread of the anesthesia was seen on ultrasound in close proximity thereto.  There were no abnormalities seen on ultrasound; a digital image was taken; and the patient tolerated the procedure with no complications. Images:still images obtained    Block Type:interscalene  Injection Technique:catheter  Needle Type:Tuohy and echogenic  Needle Gauge:18 G    Catheter Size:20 G (20g)  Cath Depth at skin: 6 cm  Medications  Local Injected:bupivacaine 0.25% Local Amount Injected:15mL  Post Assessment  Injection Assessment: negative aspiration for heme, no paresthesia on injection and incremental injection  Patient Tolerance:comfortable throughout block  Complications:no  Additional Notes  Procedure:                 The pt was placed in semifowlers position with a slight tilt of the thorax contralateral to the insertion site.  The Insertion Site was prepped and draped in sterile fashion.  The pt was anesthetized with  IV Sedation( see meds) and  Skin and cutaneous tissue was infiltrated and  anesthetized with 1% Lidocaine 3 mls via a 25g needle.  Utilizing ultrasound guidance, a BBraun 2 inch 18 g Contiplex echogenic touhy needle was advanced in-plane.  Hydro dissection of tissue was achieved with Normal saline. Major vessels(carotid and Internal Jugular) where visualized as the brachial plexus was approached at the approximate level of C-7/ T-1.  Cervical 5 and Branches of Cervical 6 nerve roots where visualized and the needle tip was placed posterior at the level of C-6 roots.  LA spread was visualized and injection was made incrementally every 5 mls with aspiration. Injection pressure was normal or little, there was no intraneural injection, no vascular injection.      The BBraun 20 g wire stylet  catheter was then placed under US guidance on the posterior aspect of the Brachial Plexus.  Location of catheter was confirmed with NS injection visualized with US . The tuohy was then removed and the skin was sealed with Skin AFix at catheter insertion site.  Skin was prepped with mastisol and the labeled catheter  was secured with steristrips and a CHG tegaderm. Thank You.

## 2017-11-08 LAB
ANION GAP SERPL CALCULATED.3IONS-SCNC: 8 MMOL/L (ref 3–11)
BUN BLD-MCNC: 20 MG/DL (ref 9–23)
BUN/CREAT SERPL: 28.6 (ref 7–25)
CALCIUM SPEC-SCNC: 9.1 MG/DL (ref 8.7–10.4)
CHLORIDE SERPL-SCNC: 104 MMOL/L (ref 99–109)
CO2 SERPL-SCNC: 26 MMOL/L (ref 20–31)
CREAT BLD-MCNC: 0.7 MG/DL (ref 0.6–1.3)
DEPRECATED RDW RBC AUTO: 45.6 FL (ref 37–54)
ERYTHROCYTE [DISTWIDTH] IN BLOOD BY AUTOMATED COUNT: 12.9 % (ref 11.3–14.5)
GFR SERPL CREATININE-BSD FRML MDRD: 81 ML/MIN/1.73
GLUCOSE BLD-MCNC: 108 MG/DL (ref 70–100)
GLUCOSE BLDC GLUCOMTR-MCNC: 116 MG/DL (ref 70–130)
GLUCOSE BLDC GLUCOMTR-MCNC: 191 MG/DL (ref 70–130)
GLUCOSE BLDC GLUCOMTR-MCNC: 194 MG/DL (ref 70–130)
HBA1C MFR BLD: 6.8 % (ref 4.8–5.6)
HCT VFR BLD AUTO: 36.3 % (ref 34.5–44)
HGB BLD-MCNC: 11.9 G/DL (ref 11.5–15.5)
MCH RBC QN AUTO: 31.7 PG (ref 27–31)
MCHC RBC AUTO-ENTMCNC: 32.8 G/DL (ref 32–36)
MCV RBC AUTO: 96.8 FL (ref 80–99)
PLATELET # BLD AUTO: 238 10*3/MM3 (ref 150–450)
PMV BLD AUTO: 9.9 FL (ref 6–12)
POTASSIUM BLD-SCNC: 4.1 MMOL/L (ref 3.5–5.5)
RBC # BLD AUTO: 3.75 10*6/MM3 (ref 3.89–5.14)
SODIUM BLD-SCNC: 138 MMOL/L (ref 132–146)
WBC NRBC COR # BLD: 7.39 10*3/MM3 (ref 3.5–10.8)

## 2017-11-08 PROCEDURE — G8979 MOBILITY GOAL STATUS: HCPCS

## 2017-11-08 PROCEDURE — 85027 COMPLETE CBC AUTOMATED: CPT | Performed by: PHYSICIAN ASSISTANT

## 2017-11-08 PROCEDURE — 25010000002 DEXAMETHASONE SODIUM PHOSPHATE 10 MG/ML SOLUTION 1 ML VIAL: Performed by: NURSE ANESTHETIST, CERTIFIED REGISTERED

## 2017-11-08 PROCEDURE — 25010000002 ENOXAPARIN PER 10 MG: Performed by: ORTHOPAEDIC SURGERY

## 2017-11-08 PROCEDURE — 97166 OT EVAL MOD COMPLEX 45 MIN: CPT | Performed by: OCCUPATIONAL THERAPIST

## 2017-11-08 PROCEDURE — G0378 HOSPITAL OBSERVATION PER HR: HCPCS

## 2017-11-08 PROCEDURE — 97530 THERAPEUTIC ACTIVITIES: CPT | Performed by: OCCUPATIONAL THERAPIST

## 2017-11-08 PROCEDURE — 99225 PR SBSQ OBSERVATION CARE/DAY 25 MINUTES: CPT | Performed by: INTERNAL MEDICINE

## 2017-11-08 PROCEDURE — 25010000002 ROPIVACAINE PER 1 MG: Performed by: NURSE ANESTHETIST, CERTIFIED REGISTERED

## 2017-11-08 PROCEDURE — 25010000002 BUPRENORPHINE PER 0.1 MG: Performed by: NURSE ANESTHETIST, CERTIFIED REGISTERED

## 2017-11-08 PROCEDURE — 80048 BASIC METABOLIC PNL TOTAL CA: CPT | Performed by: PHYSICIAN ASSISTANT

## 2017-11-08 PROCEDURE — 82962 GLUCOSE BLOOD TEST: CPT

## 2017-11-08 PROCEDURE — 83036 HEMOGLOBIN GLYCOSYLATED A1C: CPT | Performed by: PHYSICIAN ASSISTANT

## 2017-11-08 PROCEDURE — 97162 PT EVAL MOD COMPLEX 30 MIN: CPT

## 2017-11-08 PROCEDURE — G8978 MOBILITY CURRENT STATUS: HCPCS

## 2017-11-08 PROCEDURE — 25010000003 CEFAZOLIN IN DEXTROSE 2-4 GM/100ML-% SOLUTION: Performed by: ORTHOPAEDIC SURGERY

## 2017-11-08 RX ORDER — SENNA AND DOCUSATE SODIUM 50; 8.6 MG/1; MG/1
2 TABLET, FILM COATED ORAL 2 TIMES DAILY
Status: DISCONTINUED | OUTPATIENT
Start: 2017-11-08 | End: 2017-11-09 | Stop reason: HOSPADM

## 2017-11-08 RX ADMIN — PANTOPRAZOLE SODIUM 40 MG: 40 TABLET, DELAYED RELEASE ORAL at 08:37

## 2017-11-08 RX ADMIN — HYDROCODONE BITARTRATE AND ACETAMINOPHEN 1 TABLET: 10; 325 TABLET ORAL at 05:53

## 2017-11-08 RX ADMIN — ROPIVACAINE HYDROCHLORIDE 6 ML/HR: 2 INJECTION, SOLUTION EPIDURAL; INFILTRATION at 17:26

## 2017-11-08 RX ADMIN — ROPIVACAINE HYDROCHLORIDE 6 ML/HR: 2 INJECTION, SOLUTION EPIDURAL; INFILTRATION at 02:30

## 2017-11-08 RX ADMIN — METFORMIN HYDROCHLORIDE 500 MG: 500 TABLET, FILM COATED ORAL at 17:25

## 2017-11-08 RX ADMIN — ENOXAPARIN SODIUM 30 MG: 30 INJECTION SUBCUTANEOUS at 08:38

## 2017-11-08 RX ADMIN — POLYETHYLENE GLYCOL 3350 17 G: 17 POWDER, FOR SOLUTION ORAL at 17:25

## 2017-11-08 RX ADMIN — POLYETHYLENE GLYCOL 3350 17 G: 17 POWDER, FOR SOLUTION ORAL at 08:38

## 2017-11-08 RX ADMIN — LIDOCAINE 3 PATCH: 50 PATCH CUTANEOUS at 08:38

## 2017-11-08 RX ADMIN — OXYCODONE HYDROCHLORIDE AND ACETAMINOPHEN 1 TABLET: 7.5; 325 TABLET ORAL at 10:18

## 2017-11-08 RX ADMIN — CEFAZOLIN SODIUM 2 G: 2 INJECTION, SOLUTION INTRAVENOUS at 08:38

## 2017-11-08 RX ADMIN — ASPIRIN 81 MG: 81 TABLET, COATED ORAL at 08:37

## 2017-11-08 RX ADMIN — METFORMIN HYDROCHLORIDE 500 MG: 500 TABLET, FILM COATED ORAL at 08:37

## 2017-11-08 RX ADMIN — HYDROCODONE BITARTRATE AND ACETAMINOPHEN 1 TABLET: 10; 325 TABLET ORAL at 00:18

## 2017-11-08 RX ADMIN — ATORVASTATIN CALCIUM 10 MG: 10 TABLET, FILM COATED ORAL at 21:25

## 2017-11-08 RX ADMIN — OXYCODONE HYDROCHLORIDE AND ACETAMINOPHEN 1 TABLET: 7.5; 325 TABLET ORAL at 02:44

## 2017-11-08 RX ADMIN — DEXAMETHASONE SODIUM PHOSPHATE 31.4 ML: 10 INJECTION, SOLUTION INTRAMUSCULAR; INTRAVENOUS at 12:38

## 2017-11-08 RX ADMIN — CEFAZOLIN SODIUM 2 G: 2 INJECTION, SOLUTION INTRAVENOUS at 00:18

## 2017-11-08 RX ADMIN — HYDROCODONE BITARTRATE AND ACETAMINOPHEN 1 TABLET: 10; 325 TABLET ORAL at 21:25

## 2017-11-08 NOTE — PLAN OF CARE
Problem: Patient Care Overview (Adult)  Goal: Plan of Care Review  Outcome: Ongoing (interventions implemented as appropriate)    11/08/17 1332   Coping/Psychosocial Response Interventions   Plan Of Care Reviewed With patient   Outcome Evaluation   Outcome Summary/Follow up Plan Pt ambulated 30 feet with CGAx2 and HHA for balance. Pt is severely limited by pain, pt not safe to discharge home today. Rehab vs home with assist and HHPT at discharge         Problem: Inpatient Physical Therapy  Goal: Bed Mobility Goal LTG- PT  Outcome: Ongoing (interventions implemented as appropriate)    11/08/17 1332   Bed Mobility PT LTG   Bed Mobility PT LTG, Date Established 11/08/17   Bed Mobility PT LTG, Time to Achieve 5 days   Bed Mobility PT LTG, Activity Type supine to sit/sit to supine   Bed Mobility PT LTG, [place] Level contact guard assist   Bed Mobility PT LTG, Date Goal Reviewed 11/08/17   Bed Mobility PT LTG, Outcome goal ongoing       Goal: Transfer Training Goal 1 LTG- PT  Outcome: Ongoing (interventions implemented as appropriate)    11/08/17 1332   Transfer Training PT LTG   Transfer Training PT LTG, Date Established 11/08/17   Transfer Training PT LTG, Time to Achieve 5 days   Transfer Training PT LTG, Activity Type sit to stand/stand to sit   Transfer Training PT LTG, [place] Level conditional independence   Transfer Training PT LTG, Assist Device cane, straight   Transfer Training PT LTG, Date Goal Reviewed 11/08/17   Transfer Training PT LTG, Outcome goal ongoing       Goal: Gait Training Goal LTG- PT  Outcome: Ongoing (interventions implemented as appropriate)    11/08/17 1332   Gait Training PT LTG   Gait Training Goal PT LTG, Date Established 11/08/17   Gait Training Goal PT LTG, Time to Achieve 5 days   Gait Training Goal PT LTG, [place] Level supervision required   Gait Training Goal PT LTG, Assist Device cane, straight   Gait Training Goal PT LTG, Distance to Achieve 150 feet   Gait Training  Goal PT LTG, Date Goal Reviewed 11/08/17   Gait Training Goal PT LTG, Outcome goal ongoing       Goal: Stair Training Goal LTG- PT  Outcome: Ongoing (interventions implemented as appropriate)    11/08/17 1332   Stair Training PT LTG   Stair Training Goal PT LTG, Date Established 11/08/17   Stair Training Goal PT LTG, Time to Achieve 5 days   Stair Training Goal PT LTG, Number of Steps 4   Stair Training Goal PT LTG, [place] Level contact guard assist   Stair Training Goal PT LTG, Assist Device 1 handrail   Stair Training Goal PT LTG, Date Goal Reviewed 11/08/17   Stair Training Goal PT LTG, Outcome goal ongoing

## 2017-11-08 NOTE — PROGRESS NOTES
Continued Stay Note   [place]     Patient Name: Julia Blackmon  MRN: [id number]  Today's Date: 11/8/2017    Admit Date: 11/7/2017          Discharge Plan       11/08/17 6275    Case Management/Social Work Plan    Plan Home with     Patient/Family In Agreement With Plan yes    Additional Comments Case mgt con't to follow. D/C plan is to go to daughter's home at d/c. Arragements made for Home PT/OT,discussed with Dr Ramos referred to [facility], I spoke with Ryan who accepted referral.. Case mgt will f/u in am              Discharge Codes     None            Sonja C Kellerman, RN

## 2017-11-08 NOTE — PROGRESS NOTES
Post op day 1  Complains of severe pain 10/10 has been re-evaluated with pain medicine service with recommendation for redosing ISB  Vital signs stable  Wound dry  Distal neurovascular intact  A/P  Moderate to severe post op pain  Continue inpatient pain control and consult case management for home needs  Probable discharge to home tomorrow

## 2017-11-08 NOTE — THERAPY EVALUATION
Acute Care - Occupational Therapy Initial Evaluation  [facility]     Patient Name: Julia Blackmon  : 1940  MRN: [id number]  Today's Date: 2017  Onset of Illness/Injury or Date of Surgery Date: 17  Date of Referral to OT: 17  Referring Physician: Dr. Ramos    Admit Date: 2017       ICD-10-CM ICD-9-CM   1. Incomplete tear of right rotator cuff M75.111 840.4   2. Impaired functional mobility, balance, gait, and endurance Z74.09 V49.89   3. Impaired mobility and ADLs Z74.09 799.89     Patient Active Problem List   Diagnosis   • Well woman exam with routine gynecological exam   • Incomplete rotator cuff tear   • Elevated blood pressure reading   • Chronic prescription opiate use   • Depression   • GERD (gastroesophageal reflux disease)   • Controlled type 2 diabetes mellitus without complication, without long-term current use of insulin   • Chronic constipation   • Chronic back pain     Past Medical History:   Diagnosis Date   • Allergic rhinitis    • Atrophic vaginitis    • Cancer     skin cancer   • Chronic constipation    • Chronic fatigue    • Chronic low back pain    • Chronic prescription opiate use    • Coronary artery disease    • Depression    • GERD (gastroesophageal reflux disease)    • [place] (hard of hearing)    • Memory change    • Meningioma    • MI, 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   • Non-insulin dependent type 2 diabetes mellitus    • Osteoarthritis    • Osteoporosis    • Pacemaker     home interogation   • Urinary frequency    • Wears dentures    • Wears glasses      Past Surgical History:   Procedure Laterality Date   • CHOLECYSTECTOMY     • EYE SURGERY      catarct extraction   • HYSTERECTOMY     • PACEMAKER IMPLANTATION   @ [facility]   • SHOULDER ARTHROSCOPY W/ ROTATOR CUFF REPAIR Right 2017    Procedure: SHOULDER ARTHROSCOPY WITH ROTATOR CUFF REPAIR RIGHT;  Surgeon: Hugo Ramos MD;  Location: [facility];  Service:           OT ASSESSMENT FLOWSHEET (last 72 hours)      OT  Evaluation       11/08/17 1121 11/08/17 1120 11/08/17 1026 11/08/17 1025 11/07/17 1500    Rehab Evaluation    Document Type evaluation;therapy note (daily note)  -AR evaluation  -       Subjective Information complains of;pain;agree to therapy  -AR agree to therapy;complains of;pain  -MJ       Patient Effort, Rehab Treatment good  -AR good  -MJ       Symptoms Noted During/After Treatment fatigue  -AR fatigue  -MJ       General Information    Patient Profile Review yes  -AR yes  -MJ       Onset of Illness/Injury or Date of Surgery Date 11/07/17  -AR 11/07/17  -MJ       Referring Physician Dr. Ramos  -AR MD Rachel  -       General Observations pt supine, no family at bedside. RUE in sling, interscalene nerve catheter noted  -AR Pt supine in bed, interscalene nerve catheter, IV, sling to R UE.  -       Pertinent History Of Current Problem Pt is a 77 yof who presents for surgical management of progressive right shdulder pain and dysfunction that has failed to improve with conservative measures. Pt is POD#1  right shoulder arthroscopy with rotator cuff repair. Pt is right hand dominant and PTA, pt relied on a rollator for ambulation. As of last few weeks, pt was unable to tolerate weight throught RUE d/t pain, and unable to use her rollator. She has been homebound due to this. She was having difficulty with ADLS d/t unable to raise RUE overhead. Sleep was interrupted by pain.   -AR Pt presents for surgical management of R shoulder pain and dysfunction due to rotator cuff tear that failed to improve with conservative management  -       Precautions/Limitations fall precautions;shoulder precautions;non-weight bearing status;other (see comments)   interscalane nerve catheter  -AR fall precautions;shoulder precautions;other (see comments)   sling to R UE; interscalene nerve catheter  -MJ       Prior Level of Function min assist:;all household mobility;gait;transfer;ADL's;dependent:;driving  -AR independent:;all  household mobility;community mobility;gait;transfer;bed mobility   Since injury has had severe shoulder pain, lmtd use R UE  -       Equipment Currently Used at Home rollator;grab bar;shower chair;commode  -AR rollator;commode;shower chair;grab bar  - rollator;shower chair  -MB  none  -AK    Plans/Goals Discussed With patient;agreed upon  -AR patient;agreed upon  -       Risks Reviewed patient:;LOB;nausea/vomiting;dizziness;increased discomfort;change in vital signs;increased drainage;lines disloged  -AR patient:;LOB;increased discomfort  -       Benefits Reviewed patient:;improve function;increase independence;increase strength;increase balance;decrease pain;decrease risk of DVT;increase knowledge  -AR patient:;improve function;increase independence;increase strength;increase balance;decrease pain  -       Barriers to Rehab none identified  -AR ineffective coping  -       Living Environment    Lives With alone  -AR alone  - alone  -MB  spouse  -AK    Living Arrangements apartment  -AR apartment  - apartment  -MB  apartment  -AK    Home Accessibility tub/shower is not walk in;stairs to enter home  -AR stairs to enter home  -   stairs to enter home  -AK    Number of Stairs to Enter Home 4  -AR 4  -MJ   2  -AK    Stair Railings at Home outside, present at both sides  -AR outside, present at both [facility]  -       Transportation Available car;family or friend will provide  -AR  car;family or friend will provide  -MB  car  -AK    Living Environment Comment Pt reports she lives alone and that her daughter cannot provide initial intermittent assist, however pt currently requires 24/hr care.  -AR Pt's dtr available to assist, but pt will need 24/7 assist at first  -MJ       Clinical Impression    Date of Referral to OT 11/07/17  -AR        OT Diagnosis decerased independence with ADLs  -AR        Patient/Family Goals Statement decrease pain, be able to attend services at [facility]   -AR        Criteria for  Skilled Therapeutic Interventions Met yes;treatment indicated  -AR        Rehab Potential good, to achieve stated therapy goals  -AR        Therapy Frequency daily   per priority policy  -AR        Anticipated Discharge Disposition inpatient rehabilitation facility  -AR        Functional Level Prior    Ambulation    1-->assistive equipment  -MB 0-->independent  -AK    Transferring    0-->independent  -MB 0-->independent  -AK    Toileting    0-->independent  -MB 0-->independent  -AK    Bathing    0-->independent  -MB 0-->independent  -AK    Dressing    0-->independent  -MB 0-->independent  -AK    Eating    0-->independent  -MB 0-->independent  -AK    Communication    0-->understands/communicates without difficulty  -MB 0-->understands/communicates without difficulty  -AK    Swallowing    0-->swallows foods/liquids without difficulty  -MB 0-->swallows foods/liquids without difficulty  -AK    Prior Functional Level Comment     na  -AK    Pain Assessment    Pain Assessment 0-10  -AR 0-10  -MJ       Pain Score 10  -AR 10  -MJ       Post Pain Score 10  -AR 9  -MJ       Pain Type Acute pain  -AR Acute pain  -MJ       Pain Location Shoulder  -AR Shoulder  -MJ       Pain Orientation Right;Anterior;Other (Comment)   anterior and axilla region  -AR Right  -MJ       Pain Intervention(s) Repositioned;Ambulation/increased activity  -AR Repositioned;Ambulation/increased activity  -MJ       Response to Interventions poor tolerance  -AR        Cognitive Assessment/Intervention    Current Cognitive/Communication Assessment functional  -AR functional  -MJ       Orientation Status oriented x 4  -AR oriented x 4  -MJ       Follows Commands/Answers Questions 100% of the time;able to follow single-step instructions  -% of the time;able to follow single-step instructions;needs cueing;needs repetition  -MJ       Personal Safety mild impairment  -AR mild impairment  -MJ       Personal Safety Interventions fall prevention program  maintained  -AR        ROM (Range of Motion)    General ROM other (see comments)   LUE WFL, unable to acurately assess RUE d/t pain  -AR no range of motion deficits identified  -MJ       General ROM Detail  for B LE; defer UE to OT  -MJ       MMT (Manual Muscle Testing)    General MMT Assessment other (see comments)   LUE WFL, RUE deferred  -AR no strength deficits identified  -MJ       General MMT Assessment Detail  for B LE; defer UE to OT  -MJ       Mobility Assessment/Training    Extremity Weight-Bearing Status right upper extremity  -AR right upper extremity  -MJ       Right Upper Extremity Weight-Bearing non weight-bearing  -AR non weight-bearing  -MJ       Bed Mobility, Assessment/Treatment    Bed Mobility, Assistive Device bed rails;head of bed elevated  -AR bed rails;head of bed elevated  -MJ       Bed Mobility, Scoot/Bridge, [place] contact guard assist  -AR        Bed Mob, Supine to Sit, [place] minimum assist (75% patient effort);2 person assist required;verbal cues required  -AR minimum assist (75% patient effort);2 person assist required;verbal cues required  -MJ       Bed Mob, Sit to Supine, [place] contact guard assist;2 person assist required;verbal cues required  -AR contact guard assist;verbal cues required  -MJ       Bed Mobility, Safety Issues  decreased use of arms for pushing/pulling  -MJ       Bed Mobility, Impairments strength decreased;impaired balance;pain  -AR strength decreased;pain  -MJ       Bed Mobility, Comment Educated pt on NWB RUE and safe sleeping positions. Pt required cues to sequence as well as increased time and effort to complete.   -AR Verbal cues for sequencing and NWB R UE, assist with trunk to come to upright sitting. Increased time and effort to perform  -MJ       Transfer Assessment/Treatment    Transfers, Sit-Stand [place] contact guard assist;2 person assist required;verbal cues required  -AR contact guard assist;2 person assist required;verbal  cues required  -MJ       Transfers, Stand-Sit [place] contact guard assist;2 person assist required;verbal cues required  -AR contact guard assist;2 person assist required;verbal cues required  -MJ       Transfers, Sit-Stand-Sit, Assist Device other (see comments)   GHADA MAGALLANES  -AR --   HHA on L  -MJ       Toilet Transfer, [place] contact guard assist;1 person + 1 person to manage equipment  -AR contact guard assist;1 person + 1 person to manage equipment;verbal cues required  -MJ       Toilet Transfer, Assistive Device elevated toilet seat  -AR bedside commode without drop arms   HHA on L  -MJ       Transfer, Safety Issues  sequencing ability decreased;step length decreased;weight-shifting ability decreased  -MJ       Transfer, Impairments strength decreased;impaired balance;pain  -AR strength decreased;pain  -MJ       Transfer, Comment pt requiring LUE HHA for balance and cues to attend to lines to avoid dislodgement  -AR Verbal cues for correct hand placement. Assisted pt to bathroom  -MJ       Functional Mobility    Functional Mobility- Ind. Level contact guard assist;1 person + 1 person to manage equipment  -AR        Functional Mobility- Device other (see comments)   GHADA HHA  -AR        Upper Body Bathing Assessment/Training    UB Bathing Assess/Train, Comment Educated pt on right shoulder precauitons and axilla care to maintain  -AR        Upper Body Dressing Assessment/Training    UB Dressing Assess/Train, Clothing Type doffing:;donning:   sling- opening straps only. Pt unable tolerate removal   -AR        UB Dressing Assess/Train, Assist Device tootie technique  -AR        UB Dressing Assess/Train, Position supine  -AR        UB Dressing Assess/Train, [place] dependent (less than 25% patient effort)  -AR        UB Dressing Assess/Train, Impairments pain  -AR        UB Dressing Assess/Train, Comment Pt unable tolerate full removal of sling d/t pain, c/o pain with therapist simply opening straps. Pt  has washcloths at elbow as she belives sling is to large. OT adjusted sling to get elbow in pocket and pt requesting to place washcloth back inside sling at elbow. No family present for teaching on sling management and ADL retraining to maintain shoulder precautions. OT reviewed with pt, however pt's attention was limited by pain and fatigue, anticiapte limited carry-ovr for these reasons.   -AR        Toileting Assessment/Training    Toileting Assess/Train, Assistive Device raised toilet seat  -AR        Toileting Assess/Train, Position standing  -AR        Toileting Assess/Train, Indepen Level minimum assist (75% patient effort);contact guard assist   CGA post-toilet hygiene, min assist for clothing management  -AR        Toileting Assess/Train, Impairments strength decreased;impaired balance;pain  -AR        Grooming Assessment/Training    Grooming Assess/Train, Position standing  -AR        Grooming Assess/Train, Indepen Level minimum assist (75% patient effort)  -AR        Therapy Exercises    Right Upper Extremity AROM:;10 reps;supine;hand pumps;AAROM:;pronation/supination;elbow flexion/extension   tolerated 40 wrist flex/ext; elbow AAROM   -AR        Exercise Protocols --   pt unable to tolerate any shoulder ROM  -AR        Sensory Assessment/Intervention    Light Touch RUE  -AR        RUE Light Touch mild impairment  -AR        General Therapy Interventions    ADL Retraining Reviewed R shoudler precautions, ADL retraining to maintain, sling management   -AR        Home Exercise Program issued RUE HEP, however pt uable to tolerate any shoulder movement  -AR        Positioning and Restraints    Pre-Treatment Position in bed  -AR in bed  -MJ       Post Treatment Position bed  -AR bed  -MJ       In Bed supine;call light within reach;encouraged to call for assist;exit alarm on;with brace  -AR notified nsg;supine;call light within reach;encouraged to call for assist;with OT  -MJ         User Key  (r) =  Recorded By, (t) = Taken By, (c) = Cosigned By    Initials Name Effective Dates    AR Cathy Emersonjasson Pérez, OT 06/22/15 -     HANH Hidalgo, PT 03/14/16 -     HARJIT Talbot, RN 10/26/17 -     BRIONNA Sierra, NAVDEEP 06/16/16 -            Occupational Therapy Education     Title: PT OT SLP Therapies (Active)     Topic: Occupational Therapy (Active)     Point: ADL training (Active)    Description: Instruct learner(s) on proper safety adaptation and remediation techniques during self care or transfers.   Instruct in proper use of assistive devices.    Learning Progress Summary    Learner Readiness Method Response Comment Documented by Status   Patient Acceptance E,TB,D,H NR reviewed right shoudler precautions, ADL retraining to maintain, NWB RUE, RUE HEP, bed mobility, transfer training AR 11/08/17 1406 Active               Point: Home exercise program (Active)    Description: Instruct learner(s) on appropriate technique for monitoring, assisting and/or progressing therapeutic exercises/activities.    Learning Progress Summary    Learner Readiness Method Response Comment Documented by Status   Patient Acceptance E,TB,D,H NR reviewed right shoudler precautions, ADL retraining to maintain, NWB RUE, RUE HEP, bed mobility, transfer training AR 11/08/17 1406 Active               Point: Precautions (Active)    Description: Instruct learner(s) on prescribed precautions during self-care and functional transfers.    Learning Progress Summary    Learner Readiness Method Response Comment Documented by Status   Patient Acceptance E,TB,D,H NR reviewed right shoudler precautions, ADL retraining to maintain, NWB RUE, RUE HEP, bed mobility, transfer training AR 11/08/17 1406 Active               Point: Body mechanics (Active)    Description: Instruct learner(s) on proper positioning and spine alignment during self-care, functional mobility activities and/or exercises.    Learning Progress Summary    Learner Readiness Method  Response Comment Documented by Status   Patient Acceptance E,TB,D,H NR reviewed right shoudler precautions, ADL retraining to maintain, NWB RUE, RUE HEP, bed mobility, transfer training AR 11/08/17 1406 Active                      User Key     Initials Effective Dates Name Provider Type Discipline    AR 06/22/15 -  Cathy Pérez, OT Occupational Therapist OT                  OT Recommendation and Plan  Anticipated Discharge Disposition: inpatient rehabilitation facility  Therapy Frequency: daily (per priority policy)  Plan of Care Review  Plan Of Care Reviewed With: patient  Progress: improving  Outcome Summary/Follow up Plan: Pt limited by uncontrolled 10/10 shoulder pain, anterior and axilla region. Pt unable to tolerate OT doffing sling and unable to tolerate any shoulder ROM, and she tolerated minimal elbow AAROM (). She lives alone and is currently requiring assist with all ADLS and mobility. No family present for teaching for shoudler precautions, ADL retraining, RUE HEP, or sling management. Based on present function, recommend IP rehab at DC in preparation for safe return home where pt lives alone.           OT Goals       11/08/17 1408          Transfer Training OT LTG    Transfer Training OT LTG, Date Established 11/08/17  -AR      Transfer Training OT LTG, Time to Achieve by discharge  -AR      Transfer Training OT LTG, Activity Type sit to stand/stand to sit;toilet  -AR      Transfer Training OT LTG, [place] Level verbal cues required;supervision required  -AR      Transfer Training OT LTG, Assist Device commode, bedside  -AR      Range of Motion OT LTG    Range of Motion Goal OT LTG, Date Established 11/08/17  -AR      Range of Motion Goal OT LTG, Time to Achieve by discharge  -AR      Range of Motion Goal OT LTG, AROM Measure Pt will complete RUE HEP with min assist in preparation for safe DC home.   -AR      Patient Education OT LTG    Patient Education OT LTG, Date Established  11/08/17  -AR      Patient Education OT LTG, Time to Achieve by discharge  -AR      Patient Education OT LTG, Education Type precautions per surgeon;brace use/care;positioning;1 hand/tootie technique;home safety  -AR      Patient Education OT LTG, Education Understanding demonstrates adequately;verbalizes understanding  -AR      UB Dressing OT LTG    UB Dressing Goal OT LTG, Date Established 11/08/17  -AR      UB Dressing Goal OT LTG, Time to Achieve by discharge  -AR      UB Dressing Goal OT LTG, Activity Type Pt and family will don/doff RUE sling with min cues and min assist in preparation for safe DC home.   -AR        User Key  (r) = Recorded By, (t) = Taken By, (c) = Cosigned By    Initials Name Provider Type    NEGAR Pérez OT Occupational Therapist                Outcome Measures       11/08/17 1121 11/08/17 1120       How much help from another person do you currently need...    Turning from your back to your side while in flat bed without using bedrails?  2  -MJ     Moving from lying on back to sitting on the side of a flat bed without bedrails?  2  -MJ     Moving to and from a bed to a chair (including a wheelchair)?  3  -MJ     Standing up from a chair using your arms (e.g., wheelchair, bedside chair)?  3  -MJ     Climbing 3-5 steps with a railing?  2  -MJ     To walk in hospital room?  3  -MJ     AM-PAC 6 Clicks Score  15  -MJ     How much help from another is currently needed...    Putting on and taking off regular lower body clothing? 1  -AR      Bathing (including washing, rinsing, and drying) 2  -AR      Toileting (which includes using toilet bed pan or urinal) 3  -AR      Putting on and taking off regular upper body clothing 1  -AR      Taking care of personal grooming (such as brushing teeth) 3  -AR      Eating meals 3  -AR      Score 13  -AR      Functional Assessment    Outcome Measure Options AM-PAC 6 Clicks Daily Activity (OT)  -AR AM-PAC 6 Clicks Basic Mobility (PT)  -MJ       User Key   (r) = Recorded By, (t) = Taken By, (c) = Cosigned By    Initials Name Provider Type    AR Cathy Pérez OT Occupational Therapist    HANH Hidalgo, PT Physical Therapist          Time Calculation:   OT Start Time: 1121    Therapy Charges for Today     Code Description Service Date Service Provider Modifiers Qty    [id number]  OT EVAL MOD COMPLEXITY 4 11/8/2017 Cathy Pérez, OT GO 1    [id number]  OT THERAPEUTIC ACT EA 15 MIN 11/8/2017 Cathy Pérez OT GO 1               Cathy Pérez OT  11/8/2017

## 2017-11-08 NOTE — PROGRESS NOTES
[facility] Medicine Services  PROGRESS NOTE    Patient Name: Julia Blackmon  : 1940  MRN: [id number]    Date of Admission: 2017  Length of Stay: 0  Primary Care Physician: Jewel Jorgensen MD    Subjective   Subjective     CC: F/U medical management    HPI:  Feeling better today.  Pain control is improved this afternoon following replacement of nerve block and planning d/c home tomorrow with ONQ possibly.    Review of Systems  Gen- No fevers, chills  CV- No chest pain, palpitations  Resp- No cough, dyspnea  GI- No N/V/D, abd pain, + constipation    Otherwise ROS is negative except as mentioned in the HPI.    Objective   Objective     Vital Signs:   Temp:  [96.8 °F (36 °C)-97.8 °F (36.6 °C)] 96.8 °F (36 °C)  Heart Rate:  [70-79] 79  Resp:  [16-18] 18  BP: (135-161)/(64-77) 161/77        Physical Exam:  Constitutional: No acute distress, awake, alert  HENT: NCAT, mucous membranes moist  Respiratory: Clear to auscultation bilaterally, respiratory effort normal   Cardiovascular: RRR, no murmurs, rubs, or gallops  Gastrointestinal: Positive bowel sounds, soft, nontender, nondistended  Musculoskeletal: No bilateral ankle edema, RUE bandaged at shoulder and in sling  Psychiatric: Appropriate affect, cooperative  Neurologic: Oriented x 3, Cranial Nerves grossly intact to confrontation, speech clear  Skin: No rashes    Results Reviewed:  I have personally reviewed current lab, radiology, and data and agree.      Results from last 7 days  Lab Units 17  0532   WBC 10*3/mm3 7.39   HEMOGLOBIN g/dL 11.9   HEMATOCRIT % 36.3   PLATELETS 10*3/mm3 238       Results from last 7 days  Lab Units 17  0532   SODIUM mmol/L 138   POTASSIUM mmol/L 4.1   CHLORIDE mmol/L 104   CO2 mmol/L 26.0   BUN mg/dL 20   CREATININE mg/dL 0.70   GLUCOSE mg/dL 108*   CALCIUM mg/dL 9.1     No results found for: BNP  No results found for: PHART    Microbiology Results Abnormal     None          Imaging  Results (last 24 hours)     ** No results found for the last 24 hours. **             I have reviewed the medications.    Assessment/Plan   Assessment / Plan     Hospital Problem List     * (Principal)Incomplete rotator cuff tear    Elevated blood pressure reading    Chronic prescription opiate use    Depression    GERD (gastroesophageal reflux disease)    Controlled type 2 diabetes mellitus without complication, without long-term current use of insulin    Chronic constipation    Chronic back pain             Brief Hospital Course to date:  Julia Blackmon is a 77 y.o. female admitted to [facility] on 11/7/17 for scheduled right shoulder arthroscopy with rotator cuff repair. No immediate post-operative complications. Hospital medicine following for medical management.       Assessment & Plan:    Incomplete right rotator cuff tear s/p arthroscopy with RCR repair   -By Dr. Hugo Ramos on 11/7/17- post-surgical care per surgeon  -Anesthesia following nerve catheter  -PT/OT while in house     Elevated blood pressure  -No history of hypertension  -Goal outpatient blood pressure would be <140/90 due to diabetes  -Continue to monitor BP in setting of acute pain; PRN hydralazine ordered for SBP >180 but would only use if pain adequately controlled first     Non-insulin dependent DMII  -Continue home metformin  -ACHS accuchecks, will check AM A1c      Chronic pain/chronic prescription opiate use  -Pain management per Ortho and anesthesia     CAD  -Continue ASA, atorvastatin    Chronic constipation  -Continue miralax BID  -Scheduled docusate/senna-increase to BID    GERD  -Continue PPI    DVT Prophylaxis:  Lovenox    CODE STATUS: Full Code    Disposition: I expect the patient to be discharged per Ortho, likely tomorrow to daughter's house with  PT/OT    Raquel Chand MD  11/08/17  5:34 PM

## 2017-11-08 NOTE — PROGRESS NOTES
[facility]    Acute pain service Inpatient Progress Note    Patient Name: Julia Blackmon  :  1940  MRN:  [id number]        Acute Pain  Service Inpatient Progress Note:    Analgesia:Poor  Pain Score:10/10  LOC: alert and awake  Resp Status: room air  Cardiac: VS stable  Side Effects:None  Catheter Site:clean  Cath type: peripheral nerve cath(MOOG pump)  Infusion rate: 6ml/hr  Catheter Plan:Catheter to remain Insitu and Continue catheter infusion rate unchanged  Comments: Patient having severe pain in the shoulder and upper arm, I bolused her interscalene catheter and asked the nurse to call the acute pain service if she does not get relief

## 2017-11-08 NOTE — PLAN OF CARE
Problem: Patient Care Overview (Adult)  Goal: Plan of Care Review  Outcome: Ongoing (interventions implemented as appropriate)    11/08/17 0459   Coping/Psychosocial Response Interventions   Plan Of Care Reviewed With patient   Patient Care Overview   Progress progress toward functional goals as expected   Outcome Evaluation   Outcome Summary/Follow up Plan Pt appears to have slept off and on during the 7p shift. Up to BR with assist x 1. Ropivacaine infusing at 6 ml /hr. PO pain meds given prn as ordered. Pt A&O, cooperative. VSS         Problem: Perioperative Period (Adult)  Goal: Signs and Symptoms of Listed Potential Problems Will be Absent or Manageable (Perioperative Period)  Outcome: Ongoing (interventions implemented as appropriate)

## 2017-11-08 NOTE — ADDENDUM NOTE
Addendum  created 11/08/17 1259 by Jose Brandt, JERRELL    Anesthesia Intra Blocks edited, Anesthesia Intra Meds edited, Child order released for a procedure order, LDA created via procedure documentation, LDA removed via procedure documentation, Order sets accessed, Sign clinical note

## 2017-11-08 NOTE — THERAPY EVALUATION
Acute Care - Physical Therapy Initial Evaluation  [facility]     Patient Name: Julia Blackmon  : 1940  MRN: [id number]  Today's Date: 2017   Onset of Illness/Injury or Date of Surgery Date: 17  Date of Referral to PT: 17  Referring Physician: MD Rachel      Admit Date: 2017     Visit Dx:    ICD-10-CM ICD-9-CM   1. Incomplete tear of right rotator cuff M75.111 840.4   2. Impaired functional mobility, balance, gait, and endurance Z74.09 V49.89   3. Impaired mobility and ADLs Z74.09 799.89     Patient Active Problem List   Diagnosis   • Well woman exam with routine gynecological exam   • Incomplete rotator cuff tear   • Elevated blood pressure reading   • Chronic prescription opiate use   • Depression   • GERD (gastroesophageal reflux disease)   • Controlled type 2 diabetes mellitus without complication, without long-term current use of insulin   • Chronic constipation   • Chronic back pain     Past Medical History:   Diagnosis Date   • Allergic rhinitis    • Atrophic vaginitis    • Cancer     skin cancer   • Chronic constipation    • Chronic fatigue    • Chronic low back pain    • Chronic prescription opiate use    • Coronary artery disease    • Depression    • GERD (gastroesophageal reflux disease)    • [place]    • Memory change    • Meningioma    • MI, 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   • Non-insulin dependent type 2 diabetes mellitus    • Osteoarthritis    • Osteoporosis    • Pacemaker     home interogation   • Urinary frequency    • Wears dentures    • Wears glasses      Past Surgical History:   Procedure Laterality Date   • CHOLECYSTECTOMY     • EYE SURGERY      catarct extraction   • HYSTERECTOMY     • PACEMAKER IMPLANTATION   @ [place]   • SHOULDER ARTHROSCOPY W/ ROTATOR CUFF REPAIR Right 2017    Procedure: SHOULDER ARTHROSCOPY WITH ROTATOR CUFF REPAIR RIGHT;  Surgeon: Hugo Ramos MD;  Location: [facility];  Service:           PT ASSESSMENT (last 72 hours)      PT  Evaluation       11/08/17 1120 11/08/17 1026    Rehab Evaluation    Document Type evaluation  -     Subjective Information agree to therapy;complains of;pain  -     Patient Effort, Rehab Treatment good  -     Symptoms Noted During/After Treatment fatigue  -     General Information    Patient Profile Review yes  -     Onset of Illness/Injury or Date of Surgery Date 11/07/17  -     Referring Physician MD Rachel  -     General Observations Pt supine in bed, interscalene nerve catheter, IV, sling to R UE.  -     Pertinent History Of Current Problem Pt presents for surgical management of R shoulder pain and dysfunction due to rotator cuff tear that failed to improve with conservative management  -     Precautions/Limitations fall precautions;shoulder precautions;other (see comments)   sling to R UE; interscalene nerve catheter  -     Prior Level of Function independent:;all household mobility;community mobility;gait;transfer;bed mobility   Since injury has had severe shoulder pain, lmtd use R UE  -     Equipment Currently Used at Home rollator;commode;shower chair;grab bar  - rollator;shower chair  -MB    Plans/Goals Discussed With patient;agreed upon  -     Risks Reviewed patient:;LOB;increased discomfort  -     Benefits Reviewed patient:;improve function;increase independence;increase strength;increase balance;decrease pain  -     Barriers to Rehab ineffective coping  -     Living Environment    Lives With alone  - alone  -MB    Living Arrangements apartment  - apartment  -MB    Home Accessibility stairs to enter home  -     Number of Stairs to Enter Home 4  -MJ     Stair Railings at Home outside, present at both sides  -     Transportation Available  car;family or friend will provide  -MB    Living Environment Comment Pt's dtr available to assist, but pt will need 24/7 assist at first  -     Clinical Impression    Date of Referral to PT 11/08/17  -     PT Diagnosis s/p R  shoulder arthroscopy w/ RCR  -     Criteria for Skilled Therapeutic Interventions Met yes;treatment indicated  -MJ     Pain Assessment    Pain Assessment 0-10  -MJ     Pain Score 10  -MJ     Post Pain Score 9  -MJ     Pain Type Acute pain  -MJ     Pain Location Shoulder  -     Pain Orientation Right  -MJ     Pain Intervention(s) Repositioned;Ambulation/increased activity  -MJ     Cognitive Assessment/Intervention    Current Cognitive/Communication Assessment functional  -     Orientation Status oriented x 4  -MJ     Follows Commands/Answers Questions 100% of the time;able to follow single-step instructions;needs cueing;needs repetition  -     Personal Safety mild impairment  -MJ     ROM (Range of Motion)    General ROM no range of motion deficits identified  -     General ROM Detail for B LE; defer UE to OT  -     MMT (Manual Muscle Testing)    General MMT Assessment no strength deficits identified  -     General MMT Assessment Detail for B LE; defer UE to OT  -     Mobility Assessment/Training    Extremity Weight-Bearing Status right upper extremity  -MJ     Right Upper Extremity Weight-Bearing non weight-bearing  -     Bed Mobility, Assessment/Treatment    Bed Mobility, Assistive Device bed rails;head of bed elevated  -     Bed Mob, Supine to Sit, Roberts minimum assist (75% patient effort);2 person assist required;verbal cues required  -     Bed Mob, Sit to Supine, Roberts contact guard assist;verbal cues required  -     Bed Mobility, Safety Issues decreased use of arms for pushing/pulling  -     Bed Mobility, Impairments strength decreased;pain  -MJ     Bed Mobility, Comment Verbal cues for sequencing and NWB R UE, assist with trunk to come to upright sitting. Increased time and effort to perform  -     Transfer Assessment/Treatment    Transfers, Sit-Stand [place] contact guard assist;2 person assist required;verbal cues required  -     Transfers, Stand-Sit  [place] contact guard assist;2 person assist required;verbal cues required  -MJ     Transfers, Sit-Stand-Sit, Assist Device --   HHA on L  -MJ     Toilet Transfer, [place] contact guard assist;1 person + 1 person to manage equipment;verbal cues required  -MJ     Toilet Transfer, Assistive Device bedside commode without drop arms   HHA on L  -MJ     Transfer, Safety Issues sequencing ability decreased;step length decreased;weight-shifting ability decreased  -MJ     Transfer, Impairments strength decreased;pain  -MJ     Transfer, Comment Verbal cues for correct hand placement. Assisted pt to bathroom  -MJ     Gait Assessment/Treatment    Gait, [place] Level contact guard assist;1 person + 1 person to manage equipment;verbal cues required  -MJ     Gait, Assistive Device --   HHA on L  -MJ     Gait, Distance (Feet) 30  -MJ     Gait, Gait Pattern Analysis swing-through gait  -MJ     Gait, Gait Deviations melonie decreased;step length decreased;weight-shifting ability decreased  -MJ     Gait, Safety Issues step length decreased;weight-shifting ability decreased  -MJ     Gait, Impairments strength decreased;pain  -MJ     Gait, Comment Pt demo step through gait pattern at slow pace. Pt mildly unsteady requiring HHA on L, but no LOB. Gait limited by pain  -MJ     Positioning and Restraints    Pre-Treatment Position in bed  -MJ     Post Treatment Position bed  -MJ     In Bed notified nsg;supine;call light within reach;encouraged to call for assist;with OT  -MJ       11/07/17 1500       General Information    Equipment Currently Used at Home none  -AK     Living Environment    Lives With spouse  -AK     Living Arrangements apartment  -AK     Home Accessibility stairs to enter home  -AK     Number of Stairs to Enter Home 2  -AK     Transportation Available car  -AK       User Key  (r) = Recorded By, (t) = Taken By, (c) = Cosigned By    Initials Name Provider Type    HANH Hidalgo, PT Physical Therapist    MB  Pepper Talbot, RN Case Manager    AK Genesis Sierra, RN Registered Nurse          Physical Therapy Education     Title: PT OT SLP Therapies (Active)     Topic: Physical Therapy (Active)     Point: Mobility training (Done)    Learning Progress Summary    Learner Readiness Method Response Comment Documented by Status   Patient Acceptance RAYMOND CURRAN,NR Reviewed benefits of mobility  11/08/17 1330 Done               Point: Body mechanics (Done)    Learning Progress Summary    Learner Readiness Method Response Comment Documented by Status   Patient Acceptance RAYMOND CURRAN,NR Reviewed benefits of mobility  11/08/17 1330 Done               Point: Precautions (Done)    Learning Progress Summary    Learner Readiness Method Response Comment Documented by Status   Patient Acceptance RAYMOND CURRAN,NR Reviewed benefits of mobility  11/08/17 1330 Done                      User Key     Initials Effective Dates Name Provider Type Discipline     03/14/16 -  Florin Hidalgo PT Physical Therapist PT                PT Recommendation and Plan  Anticipated Discharge Disposition: (S) inpatient rehabilitation facility (vs home with 24/7 assist and HHPT)  Planned Therapy Interventions: balance training, bed mobility training, gait training, home exercise program, patient/family education, stair training, strengthening, transfer training  PT Frequency: daily  Plan of Care Review  Plan Of Care Reviewed With: patient  Outcome Summary/Follow up Plan: Pt ambulated 30 feet with CGAx2 and HHA for balance. Pt is severely limited by pain, pt not safe to discharge home today. Rehab vs home with assist and HHPT at discharge          IP PT Goals       11/08/17 1332          Bed Mobility PT LTG    Bed Mobility PT LTG, Date Established 11/08/17  -MJ      Bed Mobility PT LTG, Time to Achieve 5 days  -MJ      Bed Mobility PT LTG, Activity Type supine to sit/sit to supine  -MJ      Bed Mobility PT LTG, [place] Level contact guard assist  -MJ      Bed Mobility  PT LTG, Date Goal Reviewed 11/08/17  -MJ      Bed Mobility PT LTG, Outcome goal ongoing  -MJ      Transfer Training PT LTG    Transfer Training PT LTG, Date Established 11/08/17  -MJ      Transfer Training PT LTG, Time to Achieve 5 days  -MJ      Transfer Training PT LTG, Activity Type sit to stand/stand to sit  -MJ      Transfer Training PT LTG, [place] Level conditional independence  -MJ      Transfer Training PT LTG, Assist Device cane, straight  -MJ      Transfer Training PT  LTG, Date Goal Reviewed 11/08/17  -MJ      Transfer Training PT LTG, Outcome goal ongoing  -MJ      Gait Training PT LTG    Gait Training Goal PT LTG, Date Established 11/08/17  -MJ      Gait Training Goal PT LTG, Time to Achieve 5 days  -MJ      Gait Training Goal PT LTG, [place] Level supervision required  -MJ      Gait Training Goal PT LTG, Assist Device cane, straight  -MJ      Gait Training Goal PT LTG, Distance to Achieve 150 feet  -MJ      Gait Training Goal PT LTG, Date Goal Reviewed 11/08/17  -MJ      Gait Training Goal PT LTG, Outcome goal ongoing  -MJ      Stair Training PT LTG    Stair Training Goal PT LTG, Date Established 11/08/17  -MJ      Stair Training Goal PT LTG, Time to Achieve 5 days  -MJ      Stair Training Goal PT LTG, Number of Steps 4  -MJ      Stair Training Goal PT LTG, [place] Level contact guard assist  -MJ      Stair Training Goal PT LTG, Assist Device 1 handrail  -MJ      Stair Training Goal PT LTG, Date Goal Reviewed 11/08/17  -MJ      Stair Training Goal PT LTG, Outcome goal ongoing  -MJ        User Key  (r) = Recorded By, (t) = Taken By, (c) = Cosigned By    Initials Name Provider Type    HANH Hidalgo, PT Physical Therapist                Outcome Measures       11/08/17 1120          How much help from another person do you currently need...    Turning from your back to your side while in flat bed without using bedrails? 2  -MJ      Moving from lying on back to sitting on the side of a flat  bed without bedrails? 2  -MJ      Moving to and from a bed to a chair (including a wheelchair)? 3  -MJ      Standing up from a chair using your arms (e.g., wheelchair, bedside chair)? 3  -MJ      Climbing 3-5 steps with a railing? 2  -MJ      To walk in hospital room? 3  -MJ      AM-PAC 6 Clicks Score 15  -MJ      Functional Assessment    Outcome Measure Options AM-PAC 6 Clicks Basic Mobility (PT)  -MJ        User Key  (r) = Recorded By, (t) = Taken By, (c) = Cosigned By    Initials Name Provider Type    HANH Hidalgo PT Physical Therapist           Time Calculation:         PT Charges       11/08/17 1335          Time Calculation    Start Time 1120  -MJ      PT Received On 11/08/17  -MJ      PT Goal Re-Cert Due Date 11/18/17  -      Time Calculation- PT    Total Timed Code Minutes- PT 5 minute(s)  -        User Key  (r) = Recorded By, (t) = Taken By, (c) = Cosigned By    Initials Name Provider Type    HANH Hidalgo PT Physical Therapist          Therapy Charges for Today     Code Description Service Date Service Provider Modifiers Qty    81042862157 HC PT MOBILITY CURRENT 11/8/2017 Florin Hidalgo, PT GP, CK 1    78605056970 HC PT MOBILITY PROJECTED 11/8/2017 Florin Hidalgo, PT GP, CJ 1    90730885371 HC PT EVAL MOD COMPLEXITY 4 11/8/2017 Florin Hidalgo PT GP 1          PT G-Codes  PT Professional Judgement Used?: Yes  Outcome Measure Options: AM-PAC 6 Clicks Basic Mobility (PT)  Score: 15  Functional Limitation: Mobility: Walking and moving around  Mobility: Walking and Moving Around Current Status (): At least 40 percent but less than 60 percent impaired, limited or restricted  Mobility: Walking and Moving Around Goal Status (): At least 20 percent but less than 40 percent impaired, limited or restricted      Florin Hidalgo PT  11/8/2017

## 2017-11-08 NOTE — PLAN OF CARE
Problem: Patient Care Overview (Adult)  Goal: Plan of Care Review  Outcome: Ongoing (interventions implemented as appropriate)    11/08/17 1408   Coping/Psychosocial Response Interventions   Plan Of Care Reviewed With patient   Patient Care Overview   Progress improving   Outcome Evaluation   Outcome Summary/Follow up Plan Pt limited by uncontrolled 10/10 shoulder pain, anterior and axilla region. Pt unable to tolerate OT doffing sling and unable to tolerate any shoulder ROM, and she tolerated minimal elbow AAROM (). She lives alone and is currently requiring assist with all ADLS and mobility. No family present for teaching for shoudler precautions, ADL retraining, RUE HEP, or sling management. Based on present function, recommend IP rehab at DC in preparation for safe return home where pt lives alone.          Problem: Inpatient Occupational Therapy  Goal: Transfer Training Goal 1 LTG- OT  Outcome: Ongoing (interventions implemented as appropriate)    11/08/17 1408   Transfer Training OT LTG   Transfer Training OT LTG, Date Established 11/08/17   Transfer Training OT LTG, Time to Achieve by discharge   Transfer Training OT LTG, Activity Type sit to stand/stand to sit;toilet   Transfer Training OT LTG, [place] Level verbal cues required;supervision required   Transfer Training OT LTG, Assist Device commode, bedside       Goal: Range of Motion Goal LTG- OT  Outcome: Ongoing (interventions implemented as appropriate)    11/08/17 1408   Range of Motion OT LTG   Range of Motion Goal OT LTG, Date Established 11/08/17   Range of Motion Goal OT LTG, Time to Achieve by discharge   Range of Motion Goal OT LTG, AROM Measure Pt will complete RUE HEP with min assist in preparation for safe DC home.        Goal: Patient Education Goal LTG- OT  Outcome: Ongoing (interventions implemented as appropriate)    11/08/17 1408   Patient Education OT LTG   Patient Education OT LTG, Date Established 11/08/17   Patient Education  OT LTG, Time to Achieve by discharge   Patient Education OT LTG, Education Type precautions per surgeon;brace use/care;positioning;1 hand/tootie technique;home safety   Patient Education OT LTG, Education Understanding demonstrates adequately;verbalizes understanding       Goal: UB Dressing Goal LTG- OT  Outcome: Ongoing (interventions implemented as appropriate)    11/08/17 1408   UB Dressing OT LTG   UB Dressing Goal OT LTG, Date Established 11/08/17   UB Dressing Goal OT LTG, Time to Achieve by discharge   UB Dressing Goal OT LTG, Activity Type Pt and family will don/doff RUE sling with min cues and min assist in preparation for safe DC home.

## 2017-11-08 NOTE — NURSING NOTE
Acute pain Service:  Lengthy conversation with patients daughter, Haley. Originally patient was not going to have support at home and Acute Pain Service planned to DC nerve catheter prior to discharge.  Haley has agreed to take patient home with her and stated that she will be home from work around 6:00 pm and her  would be home until about 2:00 pm leaving the window for patient to be alone to be very small.  Discussed this option with Anesthesia and the possibility of ONQ  If daughter available to assist.   Daughter also expressed concern for patient to ambulate stairs safely at home and how to manage pain appropriately.  Discussed concerns with case management who will follow up with daughter as well.  Anesthesia plans to replace interscalene block in hopes of better pain control and daughter will be available in a.m for discussion and teaching about ONQ if necessary. Explained to Haley that nursing would also provide additional teaching upon discharge and it was important for her to participate in the education. Service will continue to follow.

## 2017-11-08 NOTE — PROGRESS NOTES
Discharge Planning Assessment  [facility]     Patient Name: Julia Blackmon  MRN: [id number]  Today's Date: 11/8/2017    Admit Date: 11/7/2017          Discharge Needs Assessment       11/08/17 1026    Living Environment    Lives With alone    Living Arrangements apartment    Transportation Available car;family or friend will provide    Living Environment    Provides Primary Care For no one    Quality Of Family Relationships supportive    Able to Return to Prior Living Arrangements yes    Discharge Needs Assessment    Equipment Currently Used at Home rollator;shower chair    Equipment Needed After Discharge commode            Discharge Plan       11/08/17 1024    Case Management/Social Work Plan    Plan Home    Patient/Family In Agreement With Plan yes    Additional Comments Spoke with patient at bedside and daughter via phone. Patient lives alone in a second story apartment in [facility] [place] she was independent with ADL's and uses a rollator to assist with mobililty. She is requesting rehab bc of her pain level and stated she wants someone to take care of her for a couple of days. CM explained that to qualify for rehab she would have to need it and be able to participate in it. CM also explained that the only place a referral could be made would be [facility] r/t insurance. Patient stated she did not want to do any exercises. CM then spoke w/ her daughter, she is able to bring her mom home with her for a few days post dc. Awaiting therapy eval/recs and will revisit home/outpatient PT. Patient would like a BSC and per her request a referral has been made to [facility]. This will be delivered to patient's room today. Family available to [facility] [place]. Goal is to return home. CM will follow.         Discharge Placement     No information found                Demographic Summary       11/08/17 1024    Referral Information    Arrived From home or self-care    Reason For Consult discharge planning            Functional  Status       11/08/17 1025    Functional Status Prior    Ambulation 1-->assistive equipment    Transferring 0-->independent    Toileting 0-->independent    Bathing 0-->independent    Dressing 0-->independent    Eating 0-->independent    Communication 0-->understands/communicates without difficulty    Swallowing 0-->swallows foods/liquids without difficulty    Activity Tolerance    Usual Activity Tolerance moderate    Current Activity Tolerance fair            Psychosocial     None            Abuse/Neglect     None            Legal     None            Substance Abuse     None            Patient Forms     None          Pepper Talbot, RN

## 2017-11-08 NOTE — PLAN OF CARE
Problem: Pain, Acute (Adult)  Intervention: Monitor/Manage Analgesia    11/08/17 1452   Manage Acute Burn Pain   Bowel Intervention adequate fluid intake promoted;ambulation promoted   Pain Management Interventions medicated;other (see comments)  (NERVE CATHETER)   Safety Interventions   Medication Review/Management medications reviewed       Intervention: Mutually Develop/Implement Acute Pain Management Plan    11/08/17 1452   Manage Acute Burn Pain   Pain Management Interventions medicated;other (see comments)  (NERVE CATHETER)   Cognitive Interventions   Sensory Stimulation Regulation care clustered       Intervention: Support/Optimize Psychosocial Response to Acute Pain    11/08/17 1452   Coping Strategies   Trust Relationship/Rapport care explained;emotional support provided;choices provided   Diversional Activities television   Family/Support System Care self-care encouraged;support provided   Supportive Measures active listening utilized         Goal: Identify Related Risk Factors and Signs and Symptoms    11/08/17 1452   Pain, Acute   Related Risk Factors (Acute Pain) communication barrier;knowledge deficit;persistent pain   Signs and Symptoms (Acute Pain) sleep pattern alteration;fatigue/weakness;BADLs/IADLs reluctance/inability to perform       Goal: Acceptable Pain Control/Comfort Level  Outcome: Ongoing (interventions implemented as appropriate)    11/08/17 1452   Pain, Acute (Adult)   Acceptable Pain Control/Comfort Level making progress toward outcome

## 2017-11-08 NOTE — ANESTHESIA PROCEDURE NOTES
Peripheral Block    Patient location during procedure: pre-op  Start time: 11/8/2017 12:30 PM  Stop time: 11/8/2017 12:50 PM  Reason for block: at surgeon's request and post-op pain management  Performed by  CRNA: MADI QUEZADA  Assisted by: MACARENA SHI  Preanesthetic Checklist  Completed: patient identified, site marked, surgical consent, pre-op evaluation, timeout performed, IV checked, risks and benefits discussed and monitors and equipment checked  Prep:  Pt Position: left lateral decubitus  Sterile barriers:cap, gloves, mask and sterile barriers  Prep: ChloraPrep  Patient monitoring: blood pressure monitoring, continuous pulse oximetry and EKG  Procedure  Sedation:no  Performed under: local infiltration  Guidance:ultrasound guided  Images:still images obtained    Block Type:interscalene  Injection Technique:catheter  Needle Type:Tuohy and echogenic  Needle Gauge:18 G    Catheter Size:20 G (20g)  Cath Depth at skin: 8 cm  Medications  Preservative Free Saline:5ml  Comment:Buprenex 300 mcg  PF Decadron 2mg  Local Injected:bupivacaine 0.25% Local Amount Injected:20mL  Post Assessment  Injection Assessment: negative aspiration for heme, no paresthesia on injection and incremental injection  Patient Tolerance:comfortable throughout block  Complications:no  Additional Notes  Procedure:                 The pt was placed in semifowlers position with a slight tilt of the thorax contralateral to the insertion site.  The Insertion Site was prepped and draped in sterile fashion.  The pt was anesthetized with  IV Sedation( see meds) and  Skin and cutaneous tissue was infiltrated and anesthetized with 1% Lidocaine 3 mls via a 25g needle.  Utilizing ultrasound guidance, a BBraun 2 inch 18 g Contiplex echogenic touhy needle was advanced in-plane.  Hydro dissection of tissue was achieved with Normal saline. Major vessels(carotid and Internal Jugular) where visualized as the brachial plexus was approached at the  approximate level of C-7/ T-1.  Cervical 5 and Branches of Cervical 6 nerve roots where visualized and the needle tip was placed posterior at the level of C-6 roots.  LA spread was visualized and injection was made incrementally every 5 mls with aspiration. Injection pressure was normal or little, there was no intraneural injection, no vascular injection.      The BBraun 20 g wire stylet  catheter was then placed under US guidance on the posterior aspect of the Brachial Plexus.  Location of catheter was confirmed with NS injection visualized with US . The tuohy was then removed and the skin was sealed with Skin AFix at catheter insertion site.  Skin was prepped with mastisol and the labeled catheter  was secured with steristrips and a CHG tegaderm. Thank You.

## 2017-11-09 VITALS
TEMPERATURE: 97 F | SYSTOLIC BLOOD PRESSURE: 133 MMHG | BODY MASS INDEX: 26.9 KG/M2 | WEIGHT: 137 LBS | HEIGHT: 60 IN | RESPIRATION RATE: 18 BRPM | DIASTOLIC BLOOD PRESSURE: 70 MMHG | HEART RATE: 71 BPM | OXYGEN SATURATION: 96 %

## 2017-11-09 LAB
GLUCOSE BLDC GLUCOMTR-MCNC: 135 MG/DL (ref 70–130)
GLUCOSE BLDC GLUCOMTR-MCNC: 150 MG/DL (ref 70–130)

## 2017-11-09 PROCEDURE — G8979 MOBILITY GOAL STATUS: HCPCS

## 2017-11-09 PROCEDURE — G8989 SELF CARE D/C STATUS: HCPCS | Performed by: OCCUPATIONAL THERAPIST

## 2017-11-09 PROCEDURE — 97116 GAIT TRAINING THERAPY: CPT

## 2017-11-09 PROCEDURE — G0378 HOSPITAL OBSERVATION PER HR: HCPCS

## 2017-11-09 PROCEDURE — G8988 SELF CARE GOAL STATUS: HCPCS | Performed by: OCCUPATIONAL THERAPIST

## 2017-11-09 PROCEDURE — G8987 SELF CARE CURRENT STATUS: HCPCS | Performed by: OCCUPATIONAL THERAPIST

## 2017-11-09 PROCEDURE — 97530 THERAPEUTIC ACTIVITIES: CPT | Performed by: OCCUPATIONAL THERAPIST

## 2017-11-09 PROCEDURE — 25010000002 ENOXAPARIN PER 10 MG: Performed by: ORTHOPAEDIC SURGERY

## 2017-11-09 PROCEDURE — 82962 GLUCOSE BLOOD TEST: CPT

## 2017-11-09 PROCEDURE — G8978 MOBILITY CURRENT STATUS: HCPCS

## 2017-11-09 PROCEDURE — 25010000002 ROPIVACAINE PER 1 MG: Performed by: NURSE ANESTHETIST, CERTIFIED REGISTERED

## 2017-11-09 PROCEDURE — G8980 MOBILITY D/C STATUS: HCPCS

## 2017-11-09 RX ORDER — OXYCODONE AND ACETAMINOPHEN 10; 325 MG/1; MG/1
1 TABLET ORAL EVERY 4 HOURS PRN
Qty: 84 TABLET | Refills: 0 | Status: SHIPPED | OUTPATIENT
Start: 2017-11-09 | End: 2018-08-23

## 2017-11-09 RX ADMIN — ROPIVACAINE HYDROCHLORIDE 6 ML/HR: 2 INJECTION, SOLUTION EPIDURAL; INFILTRATION at 11:45

## 2017-11-09 RX ADMIN — OXYCODONE HYDROCHLORIDE AND ACETAMINOPHEN 1 TABLET: 7.5; 325 TABLET ORAL at 16:42

## 2017-11-09 RX ADMIN — HYDROCODONE BITARTRATE AND ACETAMINOPHEN 1 TABLET: 10; 325 TABLET ORAL at 04:57

## 2017-11-09 RX ADMIN — POLYETHYLENE GLYCOL 3350 17 G: 17 POWDER, FOR SOLUTION ORAL at 08:50

## 2017-11-09 RX ADMIN — OXYCODONE HYDROCHLORIDE AND ACETAMINOPHEN 1 TABLET: 7.5; 325 TABLET ORAL at 11:45

## 2017-11-09 RX ADMIN — Medication 2 TABLET: at 08:50

## 2017-11-09 RX ADMIN — METFORMIN HYDROCHLORIDE 500 MG: 500 TABLET, FILM COATED ORAL at 08:50

## 2017-11-09 RX ADMIN — ASPIRIN 81 MG: 81 TABLET, COATED ORAL at 08:50

## 2017-11-09 RX ADMIN — ENOXAPARIN SODIUM 30 MG: 30 INJECTION SUBCUTANEOUS at 08:49

## 2017-11-09 RX ADMIN — PANTOPRAZOLE SODIUM 40 MG: 40 TABLET, DELAYED RELEASE ORAL at 04:57

## 2017-11-09 NOTE — PLAN OF CARE
Problem: Patient Care Overview (Adult)  Goal: Plan of Care Review  Outcome: Ongoing (interventions implemented as appropriate)    11/09/17 0444   Coping/Psychosocial Response Interventions   Plan Of Care Reviewed With patient   Patient Care Overview   Progress progress toward functional goals as expected   Outcome Evaluation   Outcome Summary/Follow up Plan Pt doing well overnight. Pain control much imporved with ropivicaine block and po pain meds. Pt ambulating to bathroom. VSS       Goal: Adult Individualization and Mutuality  Outcome: Ongoing (interventions implemented as appropriate)  Goal: Discharge Needs Assessment  Outcome: Ongoing (interventions implemented as appropriate)    Problem: Pain, Acute (Adult)  Goal: Identify Related Risk Factors and Signs and Symptoms  Outcome: Ongoing (interventions implemented as appropriate)    11/08/17 1452   Pain, Acute   Related Risk Factors (Acute Pain) communication barrier;knowledge deficit;persistent pain   Signs and Symptoms (Acute Pain) sleep pattern alteration;fatigue/weakness;BADLs/IADLs reluctance/inability to perform       Goal: Acceptable Pain Control/Comfort Level  Outcome: Ongoing (interventions implemented as appropriate)    11/09/17 0444   Pain, Acute (Adult)   Acceptable Pain Control/Comfort Level making progress toward outcome

## 2017-11-09 NOTE — PROGRESS NOTES
Post operative day 2  Pain improved after repeat interscalene block but had recurrent pain over night  Vitals stable  Awake alert  Does not appear in excessive pain  Distal neurovascular intact  Will discharge home, home health arranged per case management

## 2017-11-09 NOTE — PLAN OF CARE
Problem: Patient Care Overview (Adult)  Goal: Plan of Care Review  Outcome: Ongoing (interventions implemented as appropriate)    11/09/17 0841   Coping/Psychosocial Response Interventions   Plan Of Care Reviewed With patient   Patient Care Overview   Progress improving   Outcome Evaluation   Outcome Summary/Follow up Plan Pt increased gait distance to 134 feet with CGA and straight cane and progressed to stair training. Pt is discharging home today with family, made good progress toward goals during inpatient stay         Problem: Inpatient Physical Therapy  Goal: Bed Mobility Goal LTG- PT  Outcome: Unable to achieve outcome(s) by discharge Date Met:  11/09/17 11/08/17 1332 11/09/17 0841   Bed Mobility PT LTG   Bed Mobility PT LTG, Date Established 11/08/17 --    Bed Mobility PT LTG, Time to Achieve 5 days --    Bed Mobility PT LTG, Activity Type supine to sit/sit to supine --    Bed Mobility PT LTG, [place] Level contact guard assist --    Bed Mobility PT LTG, Date Goal Reviewed --  11/09/17   Bed Mobility PT LTG, Outcome --  goal not met   Bed Mobility PT LTG, Reason Goal Not Met --  discharged from facility       Goal: Transfer Training Goal 1 LTG- PT  Outcome: Unable to achieve outcome(s) by discharge Date Met:  11/09/17 11/08/17 1332 11/09/17 0841   Transfer Training PT LTG   Transfer Training PT LTG, Date Established 11/08/17 --    Transfer Training PT LTG, Time to Achieve 5 days --    Transfer Training PT LTG, Activity Type sit to stand/stand to sit --    Transfer Training PT LTG, [place] Level conditional independence --    Transfer Training PT LTG, Assist Device cane, straight --    Transfer Training PT LTG, Date Goal Reviewed --  11/09/17   Transfer Training PT LTG, Outcome --  goal not met   Transfer Training PT LTG, Reason Goal Not Met --  discharged from facility       Goal: Gait Training Goal LTG- PT  Outcome: Unable to achieve outcome(s) by discharge Date Met:  11/09/17 11/08/17  1332 11/09/17 0841   Gait Training PT LTG   Gait Training Goal PT LTG, Date Established 11/08/17 --    Gait Training Goal PT LTG, Time to Achieve 5 days --    Gait Training Goal PT LTG, [place] Level supervision required --    Gait Training Goal PT LTG, Assist Device cane, straight --    Gait Training Goal PT LTG, Distance to Achieve 150 feet --    Gait Training Goal PT LTG, Date Goal Reviewed --  11/09/17   Gait Training Goal PT LTG, Outcome --  goal not met   Gait Training Goal PT LTG, Reason Goal Not Met --  discharged from facility       Goal: Stair Training Goal LTG- PT  Outcome: Outcome(s) achieved Date Met:  11/09/17 11/08/17 1332 11/09/17 0841   Stair Training PT LTG   Stair Training Goal PT LTG, Date Established 11/08/17 --    Stair Training Goal PT LTG, Time to Achieve 5 days --    Stair Training Goal PT LTG, Number of Steps 4 --    Stair Training Goal PT LTG, [place] Level contact guard assist --    Stair Training Goal PT LTG, Assist Device 1 handrail --    Stair Training Goal PT LTG, Date Goal Reviewed --  11/09/17   Stair Training Goal PT LTG, Outcome --  goal met

## 2017-11-09 NOTE — THERAPY DISCHARGE NOTE
Acute Care - Occupational Therapy Treatment Note/Discharge   [place]     Patient Name: Julia Blackmon  : 1940  MRN: [id number]  Today's Date: 2017  Onset of Illness/Injury or Date of Surgery Date: 17  Date of Referral to OT: 17  Referring Physician: Dr. Ramos      Admit Date: 2017    Visit Dx:     ICD-10-CM ICD-9-CM   1. Incomplete tear of right rotator cuff M75.111 840.4   2. Impaired functional mobility, balance, gait, and endurance Z74.09 V49.89   3. Impaired mobility and ADLs Z74.09 799.89   4. S/P arthroscopy of shoulder Z98.890 V45.89     Patient Active Problem List   Diagnosis   • Well woman exam with routine gynecological exam   • Incomplete rotator cuff tear   • Elevated blood pressure reading   • Chronic prescription opiate use   • Depression   • GERD (gastroesophageal reflux disease)   • Controlled type 2 diabetes mellitus without complication, without long-term current use of insulin   • Chronic constipation   • Chronic back pain             Adult Rehabilitation Note       17 0820 17 0710       Rehab Assessment/Intervention    Discipline physical therapist  -MJ occupational therapist  -ST     Document Type therapy note (daily note);discharge summary  -MJ discharge summary;therapy note (daily note)  -ST     Subjective Information agree to therapy;complains of;pain  -MJ no complaints;agree to therapy  -ST     Patient Effort, Rehab Treatment good  -MJ good  -ST     Symptoms Noted During/After Treatment fatigue  -MJ fatigue;increased pain  -ST     Precautions/Limitations fall precautions;shoulder precautions;non-weight bearing status   interscalene nerve catehter  -MJ fall precautions;other (see comments)   interscalene cath, shoulder precautions, sling  -ST     Recorded by [MJ] Florin Hidalgo, PT [ST] SANTIAGO Cisneros     Pain Assessment    Pain Assessment 0-10  -MJ 0-10  -ST     Pain Score 5  -MJ 3  -ST     Post Pain Score 5  -MJ 6  -ST     Pain Type Acute  pain  - Acute pain  -ST     Pain Location Shoulder  - Shoulder  -ST     Pain Orientation Right  -MJ Right;Anterior  -ST     Pain Intervention(s) Repositioned;Ambulation/increased activity  - Repositioned  -ST     Recorded by [MJ] Florin Hidalgo, PT [ST] Halle Naylor, OTR     Cognitive Assessment/Intervention    Current Cognitive/Communication Assessment functional  -MJ functional  -ST     Orientation Status oriented x 4  -MJ oriented x 4  -ST     Follows Commands/Answers Questions 100% of the time;able to follow multi-step instructions;needs cueing  - 100% of the time;able to follow single-step instructions  -ST     Personal Safety mild impairment  -MJ mild impairment  -ST     Personal Safety Interventions  fall prevention program maintained;gait belt;nonskid shoes/slippers when out of bed  -ST     Recorded by [MJ] Florin Hidalgo, PT [ST] Halle Naylor, OTR     ROM (Range of Motion)    General ROM Detail  LUE WFL  -ST     Recorded by  [ST] Halle Naylor, OTR     Mobility Assessment/Training    Extremity Weight-Bearing Status right upper extremity  - right upper extremity  -ST     Right Upper Extremity Weight-Bearing non weight-bearing  - non weight-bearing  -ST     Recorded by [MJ] Florin Hidalgo, PT [ST] Halle Naylor, OTR     Bed Mobility, Assessment/Treatment    Bed Mobility, Assistive Device bed rails;head of bed elevated  -MJ bed rails;head of bed elevated  -ST     Bed Mobility, Scoot/Bridge, [place]  supervision required  -ST     Bed Mob, Supine to Sit, [place] not tested   Pt sitting EOB  - supervision required  -ST     Bed Mob, Sit to Supine, [place] not tested   Pt sitting EOB  -MJ      Bed Mobility, Comment  increased time for supine to sit d/t inability to use LUE to assist, however good trunk control   -ST     Recorded by [MJ] Florin Hidalgo, PT [ST] Halle Naylor, OTR     Transfer Assessment/Treatment    Transfers, Sit-Stand [place] contact guard assist;verbal cues  required  -MJ contact guard assist  -ST     Transfers, Stand-Sit [place] contact guard assist;verbal cues required  -MJ contact guard assist  -ST     Transfer, Safety Issues step length decreased;weight-shifting ability decreased  -      Transfer, Impairments strength decreased;impaired balance;pain  -MJ      Transfer, Comment Verbal cues for hand placement  - HHA for UE support then trialed use of SPC  -ST     Recorded by [MJ] Florin Hidalgo, PT [ST] Halle Naylor OTR     Gait Assessment/Treatment    Gait, [place] Level contact guard assist;verbal cues required  -      Gait, Assistive Device straight cane  -      Gait, Distance (Feet) 140  -MJ      Gait, Gait Pattern Analysis swing-through gait  -      Gait, Gait Deviations melonie decreased;step length decreased;weight-shifting ability decreased  -      Gait, Safety Issues step length decreased;weight-shifting ability decreased  -      Gait, Impairments strength decreased;pain  -      Gait, Comment Pt demo step through gait pattern at slow pace. Verbal cues for sequencing with cane. Gait limited by fatigue  -MJ      Recorded by [MJ] Florin Hidalgo PT      Stairs Assessment/Treatment    Number of Stairs 5  -MJ      Stairs, Handrail Location left side (ascending)  -      Stairs, [place] Level contact guard assist;verbal cues required  -      Stairs, Technique Used step to step (ascending);step to step (descending)  -      Stairs, Safety Issues sequencing ability decreased;weight-shifting ability decreased  -      Stairs, Impairments ROM decreased;strength decreased;pain  -MJ      Stairs, Comment Pt performed non-reciprocally. Verbal cues for sequencing  -MJ OT assisted with lines and assist level for safety with stair training   -ST     Recorded by [MJ] Florin Hidalgo, PT [ST] Halle Naylor OTR     Functional Mobility    Functional Mobility- Ind. Level  contact guard assist;2 person assist required  -ST     Functional Mobility-  Device  straight cane  -ST     Functional Mobility-Distance (Feet)  50  -ST     Functional Mobility- Comment  improved balance with SPC vs HHA   -ST     Recorded by  [ST] Halle Naylor OTR     Upper Body Bathing Assessment/Training    UB Bathing Assess/Train, Comment  educated pt/family on axillary care per MD protocol  -ST     Recorded by  [ST] Halle Naylor, OTR     Upper Body Dressing Assessment/Training    UB Dressing Assess/Train, Clothing Type  doffing:;donning:   sling  -ST     UB Dressing Assess/Train, Assist Device  tootie technique  -ST     UB Dressing Assess/Train, Position  edge of bed;sitting  -ST     UB Dressing Assess/Train, [place]  maximum assist (25% patient effort)  -ST     UB Dressing Assess/Train, Impairments  ROM decreased;strength decreased;pain   shoulder precautions  -ST     UB Dressing Assess/Train, Comment  OT completed 1st trial with donning/doffing sling then pt's daughter completed trial for f/u at home and correct competency with adjustments and strap placements   -ST     Recorded by  [ST] Halle Naylor, OTR     Lower Body Dressing Assessment/Training    LB Dressing Assess/Train, Comment  educated on tootie technique for LBD   -ST     Recorded by  [ST] Halle Naylor OTR     Therapy Exercises    Right Upper Extremity  PROM:;AROM:;10 reps;sitting  -ST     RUE Resistance  --   completed pendulum RUE, mod cuing required; AROM digit...  -ST     Left Upper Extremity  --   wrist, elbow f/e w/several rest breaks required d/t pain &..  -ST     LUE Resistance  --   fatigue   -ST     Recorded by  [ST] Halle Naylor OTR     Positioning and Restraints    Pre-Treatment Position in bed  -MJ in bed  -ST     Post Treatment Position bed  - bed  -ST     In Bed notified nsg;sitting EOB;call light within reach;encouraged to call for assist;with family/caregiver;with OT  -MJ notified nsg;sitting EOB;call light within reach;encouraged to call for assist;with family/caregiver  -ST      Recorded by [MJ] Florin Hidalgo, PT [ST] Halle Naylor, OTR       User Key  (r) = Recorded By, (t) = Taken By, (c) = Cosigned By    Initials Name Effective Dates    ST Halle VASQUEZ Cyndy, OTR 02/20/17 -     MJ Florin Hidalgo, PT 03/14/16 -                 OT Goals       11/09/17 1025 11/08/17 1408       Transfer Training OT LTG    Transfer Training OT LTG, Date Established  11/08/17  -AR     Transfer Training OT LTG, Time to Achieve  by discharge  -AR     Transfer Training OT LTG, Activity Type  sit to stand/stand to sit;toilet  -AR     Transfer Training OT LTG, [place] Level  verbal cues required;supervision required  -AR     Transfer Training OT LTG, Assist Device  commode, bedside  -AR     Transfer Training OT LTG, Outcome goal partially met  -ST      Range of Motion OT LTG    Range of Motion Goal OT LTG, Date Established  11/08/17  -AR     Range of Motion Goal OT LTG, Time to Achieve  by discharge  -AR     Range of Motion Goal OT LTG, AROM Measure  Pt will complete RUE HEP with min assist in preparation for safe DC home.   -AR     Range of Motion Goal OT LTG, Outcome goal met  -ST      Patient Education OT LTG    Patient Education OT LTG, Date Established  11/08/17  -AR     Patient Education OT LTG, Time to Achieve  by discharge  -AR     Patient Education OT LTG, Education Type  precautions per surgeon;brace use/care;positioning;1 hand/tootie technique;home safety  -AR     Patient Education OT LTG, Education Understanding  demonstrates adequately;verbalizes understanding  -AR     Patient Education OT LTG Outcome goal met  -ST      UB Dressing OT LTG    UB Dressing Goal OT LTG, Date Established  11/08/17  -AR     UB Dressing Goal OT LTG, Time to Achieve  by discharge  -AR     UB Dressing Goal OT LTG, Activity Type  Pt and family will don/doff RUE sling with min cues and min assist in preparation for safe DC home.   -AR     UB Dressing Goal OT LTG, Outcome goal partially met  -ST        User Key  (r) = Recorded By,  (t) = Taken By, (c) = Cosigned By    Initials Name Provider Type    ST Halle Naylor, OTR Occupational Therapist    NEGAR Pérez, OT Occupational Therapist          Occupational Therapy Education     Title: PT OT SLP Therapies (Done)     Topic: Occupational Therapy (Done)     Point: ADL training (Done)    Description: Instruct learner(s) on proper safety adaptation and remediation techniques during self care or transfers.   Instruct in proper use of assistive devices.    Learning Progress Summary    Learner Readiness Method Response Comment Documented by Status   Patient Acceptance E,TB,D,H VU,DU HEP, precautions per sx/shoulder motion, sling use, adjustments and donning/doffing, axillary care, UBD/LBD, home safety ST 11/09/17 1024 Done    Acceptance E,TB,D,H NR reviewed right shoudler precautions, ADL retraining to maintain, NWB RUE, RUE HEP, bed mobility, transfer training AR 11/08/17 1406 Active   Family Acceptance E,TB,D,H VU,DU HEP, precautions per sx/shoulder motion, sling use, adjustments and donning/doffing, axillary care, UBD/LBD, home safety ST 11/09/17 1024 Done               Point: Home exercise program (Done)    Description: Instruct learner(s) on appropriate technique for monitoring, assisting and/or progressing therapeutic exercises/activities.    Learning Progress Summary    Learner Readiness Method Response Comment Documented by Status   Patient Acceptance E,TB,D,H VU,DU HEP, precautions per sx/shoulder motion, sling use, adjustments and donning/doffing, axillary care, UBD/LBD, home safety ST 11/09/17 1024 Done    Acceptance E,TB,D,H NR reviewed right shoudler precautions, ADL retraining to maintain, NWB RUE, RUE HEP, bed mobility, transfer training AR 11/08/17 1406 Active   Family Acceptance E,TB,D,H VU,DU HEP, precautions per sx/shoulder motion, sling use, adjustments and donning/doffing, axillary care, UBD/LBD, home safety ST 11/09/17 1024 Done               Point: Precautions (Done)     Description: Instruct learner(s) on prescribed precautions during self-care and functional transfers.    Learning Progress Summary    Learner Readiness Method Response Comment Documented by Status   Patient Acceptance E,TB,D,H VU,DU HEP, precautions per sx/shoulder motion, sling use, adjustments and donning/doffing, axillary care, UBD/LBD, home safety ST 11/09/17 1024 Done    Acceptance E,TB,D,H NR reviewed right shoudler precautions, ADL retraining to maintain, NWB RUE, RUE HEP, bed mobility, transfer training AR 11/08/17 1406 Active   Family Acceptance E,TB,D,H VU,DU HEP, precautions per sx/shoulder motion, sling use, adjustments and donning/doffing, axillary care, UBD/LBD, home safety  11/09/17 1024 Done               Point: Body mechanics (Done)    Description: Instruct learner(s) on proper positioning and spine alignment during self-care, functional mobility activities and/or exercises.    Learning Progress Summary    Learner Readiness Method Response Comment Documented by Status   Patient Acceptance E,TB,D,H VU,DU HEP, precautions per sx/shoulder motion, sling use, adjustments and donning/doffing, axillary care, UBD/LBD, home safety  11/09/17 1024 Done    Acceptance E,TB,D,H NR reviewed right shoudler precautions, ADL retraining to maintain, NWB RUE, RUE HEP, bed mobility, transfer training AR 11/08/17 1406 Active   Family Acceptance E,TB,D,H VU,DU HEP, precautions per sx/shoulder motion, sling use, adjustments and donning/doffing, axillary care, UBD/LBD, home safety  11/09/17 1024 Done                      User Key     Initials Effective Dates Name Provider Type Discipline    ST 02/20/17 -  Halle Naylor, OTR Occupational Therapist OT    AR 06/22/15 -  Cathy Pérez, OT Occupational Therapist OT                OT Recommendation and Plan  Anticipated Discharge Disposition: home with assist  Therapy Frequency: daily (per priority policy)  Plan of Care Review  Plan Of Care Reviewed With:  patient, spouse  Outcome Summary/Follow up Plan: Pt tolerated ROM HEP per MD protocol this date with cuing; lengthy education given to both family and pt on HEP, precautions, sling donning/doffing and care along with home safety and axillary care. Pt expected to d/c home this date with assist from daughter.           Outcome Measures       11/09/17 0820 11/09/17 0710 11/08/17 1121    How much help from another person do you currently need...    Turning from your back to your side while in flat bed without using bedrails? 3  -MJ      Moving from lying on back to sitting on the side of a flat bed without bedrails? 3  -MJ      Moving to and from a bed to a chair (including a wheelchair)? 3  -MJ      Standing up from a chair using your arms (e.g., wheelchair, bedside chair)? 3  -MJ      Climbing 3-5 steps with a railing? 3  -MJ      To walk in hospital room? 3  -MJ      AM-PAC 6 Clicks Score 18  -MJ      How much help from another is currently needed...    Putting on and taking off regular lower body clothing?  1  -ST 1  -AR    Bathing (including washing, rinsing, and drying)  2  -ST 2  -AR    Toileting (which includes using toilet bed pan or urinal)  3  -ST 3  -AR    Putting on and taking off regular upper body clothing  1  -ST 1  -AR    Taking care of personal grooming (such as brushing teeth)  3  -ST 3  -AR    Eating meals  3  -ST 3  -AR    Score  13  -ST 13  -AR    Functional Assessment    Outcome Measure Options AM-PAC 6 Clicks Basic Mobility (PT)  -MJ  AM-PAC 6 Clicks Daily Activity (OT)  -AR      11/08/17 1120          How much help from another person do you currently need...    Turning from your back to your side while in flat bed without using bedrails? 2  -MJ      Moving from lying on back to sitting on the side of a flat bed without bedrails? 2  -MJ      Moving to and from a bed to a chair (including a wheelchair)? 3  -MJ      Standing up from a chair using your arms (e.g., wheelchair, bedside chair)? 3  -MJ       Climbing 3-5 steps with a railing? 2  -MJ      To walk in hospital room? 3  -MJ      AM-PAC 6 Clicks Score 15  -MJ      Functional Assessment    Outcome Measure Options AM-PAC 6 Clicks Basic Mobility (PT)  -MJ        User Key  (r) = Recorded By, (t) = Taken By, (c) = Cosigned By    Initials Name Provider Type    ST Halle Naylor, OTR Occupational Therapist    AR Cathy Pérez, OT Occupational Therapist    HANH Hidalgo, PT Physical Therapist           Time Calculation:          Time Calculation- OT       11/09/17 1028          Time Calculation- OT    OT Start Time 0710  -ST      Total Timed Code Minutes- OT 53 minute(s)  -ST      OT Received On 11/09/17  -ST        User Key  (r) = Recorded By, (t) = Taken By, (c) = Cosigned By    Initials Name Provider Type    ST Halle Naylor, OTR Occupational Therapist          Therapy Charges for Today     Code Description Service Date Service Provider Modifiers Qty    46674751975 HC OT SELFCARE CURRENT 11/9/2017 Halle Hernandezum, OTR GO, CL 1    30813206238 HC OT SELFCARE PROJECTED 11/9/2017 Halle Naylor, OTR GO, CL 1    65491180310 HC OT SELFCARE DISCHARGE 11/9/2017 Halle Hernandezum, OTR GO, CL 1    27608162768 HC OT THERAPEUTIC ACT EA 15 MIN 11/9/2017 Halle Naylor OTR GO 4          OT G-codes  OT Professional Judgement Used?: Yes  OT Functional Scales Options: AM-PAC 6 Clicks Daily Activity (OT)  Score: 13  Functional Limitation: Self care  Self Care Current Status (): At least 60 percent but less than 80 percent impaired, limited or restricted  Self Care Goal Status (): At least 60 percent but less than 80 percent impaired, limited or restricted  Self Care Discharge Status (): At least 60 percent but less than 80 percent impaired, limited or restricted    OT Discharge Summary  Anticipated Discharge Disposition: home with assist  Reason for Discharge: Discharge from facility  Outcomes Achieved: Refer to plan of care for updates on goals  achieved  Discharge Destination: Home with assist    Halle Naylor, OTR  11/9/2017

## 2017-11-09 NOTE — PLAN OF CARE
Problem: Patient Care Overview (Adult)  Goal: Plan of Care Review  Outcome: Outcome(s) achieved Date Met:  11/09/17 11/09/17 1522   Coping/Psychosocial Response Interventions   Plan Of Care Reviewed With patient   Patient Care Overview   Progress improving   Outcome Evaluation   Outcome Summary/Follow up Plan Patient is being discharged home this evening       Goal: Adult Individualization and Mutuality  Outcome: Outcome(s) achieved Date Met:  11/09/17  Goal: Discharge Needs Assessment  Outcome: Outcome(s) achieved Date Met:  11/09/17

## 2017-11-09 NOTE — PLAN OF CARE
Problem: Patient Care Overview (Adult)  Goal: Plan of Care Review  Outcome: Outcome(s) achieved Date Met:  11/09/17 11/09/17 1025   Coping/Psychosocial Response Interventions   Plan Of Care Reviewed With patient;spouse   Outcome Evaluation   Outcome Summary/Follow up Plan Pt tolerated ROM HEP per MD protocol this date with cuing; lengthy education given to both family and pt on HEP, precautions, sling donning/doffing and care along with home safety and axillary care. Pt expected to d/c home this date with assist from daughter.          Problem: Inpatient Occupational Therapy  Goal: Transfer Training Goal 1 LTG- OT  Outcome: Unable to achieve outcome(s) by discharge Date Met:  11/09/17 11/08/17 1408 11/09/17 1025   Transfer Training OT LTG   Transfer Training OT LTG, Date Established 11/08/17 --    Transfer Training OT LTG, Time to Achieve by discharge --    Transfer Training OT LTG, Activity Type sit to stand/stand to sit;toilet --    Transfer Training OT LTG, [place] Level verbal cues required;supervision required --    Transfer Training OT LTG, Assist Device commode, bedside --    Transfer Training OT LTG, Outcome --  goal partially met       Goal: Range of Motion Goal LTG- OT  Outcome: Outcome(s) achieved Date Met:  11/09/17 11/08/17 1408 11/09/17 1025   Range of Motion OT LTG   Range of Motion Goal OT LTG, Date Established 11/08/17 --    Range of Motion Goal OT LTG, Time to Achieve by discharge --    Range of Motion Goal OT LTG, AROM Measure Pt will complete RUE HEP with min assist in preparation for safe DC home.  --    Range of Motion Goal OT LTG, Outcome --  goal met       Goal: Patient Education Goal LTG- OT  Outcome: Outcome(s) achieved Date Met:  11/09/17 11/08/17 1408 11/09/17 1025   Patient Education OT LTG   Patient Education OT LTG, Date Established 11/08/17 --    Patient Education OT LTG, Time to Achieve by discharge --    Patient Education OT LTG, Education Type precautions per  surgeon;brace use/care;positioning;1 hand/tootie technique;home safety --    Patient Education OT LTG, Education Understanding demonstrates adequately;verbalizes understanding --    Patient Education OT LTG Outcome --  goal met       Goal: UB Dressing Goal LTG- OT  Outcome: Unable to achieve outcome(s) by discharge Date Met:  11/09/17 11/08/17 1408 11/09/17 1025   UB Dressing OT LTG   UB Dressing Goal OT LTG, Date Established 11/08/17 --    UB Dressing Goal OT LTG, Time to Achieve by discharge --    UB Dressing Goal OT LTG, Activity Type Pt and family will don/doff RUE sling with min cues and min assist in preparation for safe DC home.  --    UB Dressing Goal OT LTG, Outcome --  goal partially met

## 2017-11-09 NOTE — PLAN OF CARE
Problem: Patient Care Overview (Adult)  Goal: Plan of Care Review  Outcome: Outcome(s) achieved Date Met:  11/09/17 11/09/17 1620   Coping/Psychosocial Response Interventions   Plan Of Care Reviewed With patient   Patient Care Overview   Progress improving   Outcome Evaluation   Outcome Summary/Follow up Plan Patient's pain controlled by oral pain medication, nerve catheter infusing at 8m l/hr per anesthesia.         11/09/17 1620   Coping/Psychosocial Response Interventions   Plan Of Care Reviewed With patient   Patient Care Overview   Progress improving   Outcome Evaluation   Outcome Summary/Follow up Plan Patient's pain controlled by oral pain medication, nerve catheter infusing at 8m l/hr per anesthesia. Patient being discharged this evening when daughter get off work.         Problem: Perioperative Period (Adult)  Goal: Signs and Symptoms of Listed Potential Problems Will be Absent or Manageable (Perioperative Period)  Outcome: Ongoing (interventions implemented as appropriate)

## 2017-11-09 NOTE — PROGRESS NOTES
[facility]    Acute pain service Inpatient Progress Note    Patient Name: Julia Blackmon  :  1940  MRN:  [id number]        Acute Pain  Service Inpatient Progress Note:    Analgesia:Good  Pain Score:7/10  LOC: alert and awake  Resp Status: room air  Cardiac: VS stable  Side Effects:None  Catheter Site:clean  Cath type: peripheral nerve cath(MOOG pump)  Infusion rate: 10ml/hr  Catheter Plan:Catheter to remain Insitu and Continue catheter infusion rate unchanged  Comments: Increased to 10 ml/hr x2 hours

## 2017-11-09 NOTE — PROGRESS NOTES
Continued Stay Note   [place]     Patient Name: Julia Blackmon  MRN: [id number]  Today's Date: 11/9/2017    Admit Date: 11/7/2017          Discharge Plan       11/09/17 1148    Case Management/Social Work Plan    Plan Home with     Patient/Family In Agreement With Plan yes    Additional Comments Case mgt f/u. I met with daughter this am(Tori Sosa),discussed Home health services. She confirmed Ms Blackmon will be coming to her home temporarily. No other d/c need identified.              Discharge Codes       11/09/17 1148    Discharge Codes    Discharge Codes CC  Home with A with [facility]        Expected Discharge Date and Time     Expected Discharge Date Expected Discharge Time    Nov 9, 2017             Sonja C Kellerman, RN

## 2017-11-09 NOTE — PROGRESS NOTES
[place]    Acute pain service Inpatient Progress Note    Patient Name: Julia Blackmon  :  1940  MRN:  [id number]        Acute Pain  Service Inpatient Progress Note:    Analgesia:Good  Pain Score:5/10  LOC: alert and awake  Resp Status: room air  Cardiac: VS stable  Side Effects:None  Catheter Site:clean  Cath type: peripheral nerve cath(MOOG pump)  Catheter Plan:Catheter removed and tip intact and Anesthesia removed catheter and tip intact  Comments: I bolused her interscalene catheter with Ropivacaine 0.2% 10ml with PF Decadron 2mg.  She will be discharged this evening.

## 2018-08-23 ENCOUNTER — OFFICE VISIT (OUTPATIENT)
Dept: OBSTETRICS AND GYNECOLOGY | Facility: CLINIC | Age: 78
End: 2018-08-23

## 2018-08-23 VITALS
DIASTOLIC BLOOD PRESSURE: 70 MMHG | HEIGHT: 60 IN | BODY MASS INDEX: 25.72 KG/M2 | SYSTOLIC BLOOD PRESSURE: 122 MMHG | WEIGHT: 131 LBS

## 2018-08-23 DIAGNOSIS — R35.1 NOCTURIA: Primary | ICD-10-CM

## 2018-08-23 DIAGNOSIS — N95.2 VAGINAL ATROPHY: ICD-10-CM

## 2018-08-23 DIAGNOSIS — K59.01 SLOW TRANSIT CONSTIPATION: ICD-10-CM

## 2018-08-23 PROBLEM — Z90.710 H/O: HYSTERECTOMY: Status: ACTIVE | Noted: 2018-08-23

## 2018-08-23 PROCEDURE — G0328 FECAL BLOOD SCRN IMMUNOASSAY: HCPCS | Performed by: OBSTETRICS & GYNECOLOGY

## 2018-08-23 PROCEDURE — 99202 OFFICE O/P NEW SF 15 MIN: CPT | Performed by: OBSTETRICS & GYNECOLOGY

## 2018-08-23 RX ORDER — TRIAMCINOLONE ACETONIDE 0.25 MG/G
CREAM TOPICAL
Refills: 1 | COMMUNITY
Start: 2018-07-10

## 2018-08-23 RX ORDER — OXYBUTYNIN CHLORIDE 5 MG/1
5 TABLET ORAL NIGHTLY
Qty: 90 TABLET | Refills: 3 | Status: SHIPPED | OUTPATIENT
Start: 2018-08-23 | End: 2019-08-23

## 2018-08-23 RX ORDER — HYDROCODONE BITARTRATE AND ACETAMINOPHEN 10; 325 MG/1; MG/1
TABLET ORAL
Refills: 0 | COMMUNITY
Start: 2018-07-12

## 2018-08-23 NOTE — PROGRESS NOTES
Subjective   Chief Complaint   Patient presents with   • Vaginal Pain     dryness     Julia Blackmon is a 78 y.o. year old No obstetric history on file. who is post-menopausal.  She is S/P hysterectomy presenting to be seen for her annual exam.  This past year she has not been on hormone replacement therapy.  There has not been vaginal bleeding in the last 12 months.  Menopausal symptoms are not present.  With the exception of vaginal dryness.  She reports that she experiences stops using Estrace cream for couple weeks and then has discomfort.  I suggested that she probably needs use it at least once a week.  She may try using it every 5 days for a while and if that is tolerable she can go to once a week if she would like but may not need to stop entirely.  She has a refill from her primary care doctor.  Other concern upon further questioning is frequency.  She is maybe every couple hours during the day but nocturia is a big problem.  She is up about 4-5 times at night.  She already avoids liquids in the evening.  Soft second about 7 PM and goes to sleep about 11 PM.  She does not have glaucoma.     she does have some constipation that she controls with MiraLAX..  Discussed may need more calcium in her diet and supplements that won't change anything right now.  However due to constipation may only try Ditropan at night rather than during the day as well because of side effect of dry mouth and constipation.  She has no history of glaucoma all oh she had some sort of eye tumor removed by Dr. Jose Mcmillan recently in a three-hour surgery.      SEXUAL Hx:  She is not currently sexually active.    [place] is painful: not asked  HEALTH Hx:  She exercises regularly: yes.  She wears her seat belt: yes.  She has concerns about domestic violence: no.    OTHER THINGS SHE WANTS TO DISCUSS TODAY:  Nothing else    The following portions of the patient's history were reviewed and updated as appropriate:problem list, current  "medications, allergies, past family history, past medical history, past social history and past surgical history.    Smoking status: Never Smoker                                                              Smokeless tobacco: Never Used                        Review of Systems  Constitutional POS: nothing reported    NEG: anorexia or night sweats   Genitourinary POS: frequency and nocturia    NEG: dysuria or hematuria   Gastointestinal POS: constipation (chronic)    NEG: bloating, change in bowel habits, melena or reflux symptoms   Integument POS: nothing reported    NEG: moles that are changing in size, shape, color or rashes   Breast POS: nothing reported    NEG: persistent breast lump, skin dimpling or nipple discharge        Objective   /70   Ht 152.4 cm (60\")   Wt 59.4 kg (131 lb)   BMI 25.58 kg/m²     General:  well developed; well nourished  no acute distress  appears stated age   Skin:  No suspicious lesions seen   Thyroid: not examined   Breasts:  Examined in supine position  Symmetric without masses or skin dimpling  Nipples normal without inversion, lesions or discharge  There are no palpable axillary nodes  There is a pacemaker in the left chest   Abdomen: soft, non-tender; no masses  no umbilical or inginual hernias are present  no hepato-splenomegaly   Pelvis: Clinical staff was present for exam  External genitalia:  normal appearance of the external genitalia including Bartholin's and Moonachie's glands.  :  urethral meatus normal; urethral hypermobility is absent.  Vaginal:  atrophic mucosal changes are present;  Uterus:  absent.  Adnexa:  non palpable bilaterally.  Rectal:  anus visually normal appearing. recto-vaginal exam unremarkable and confirms findings; good sphincter tone; no masses; guaiac negative;        Assessment   1. Symptomatic vaginal dryness/atrophy   2. Nocturia greater than urinary frequency discussed timed voids avoiding liquids at night and medication  3. Chronic " constipation  4. She is up to date on all relevant gynecologic and colorectal screenings     Plan   1. Add Ditropan at night   2. The importance of keeping all planned follow-up and taking all medications as prescribed was emphasized.  3. Follow up for annual exam     New Medications Ordered This Visit   Medications   • oxybutynin (DITROPAN) 5 MG tablet     Sig: Take 1 tablet by mouth Every Night. Take one pill at night     Dispense:  90 tablet     Refill:  3          This note was electronically signed.    Jordi Magallanes MD  August 23, 2018    Note: Speech recognition transcription software may have been used to create portions of this document.  An attempt at proofreading has been made but errors in transcription could still be present.

## 2018-11-01 ENCOUNTER — HOSPITAL ENCOUNTER (OUTPATIENT)
Dept: PHYSICAL THERAPY | Facility: HOSPITAL | Age: 78
Setting detail: THERAPIES SERIES
Discharge: HOME | End: 2018-11-01

## 2018-11-01 DIAGNOSIS — G89.29 CHRONIC LEFT-SIDED LOW BACK PAIN WITH LEFT-SIDED SCIATICA: Primary | ICD-10-CM

## 2018-11-01 DIAGNOSIS — M54.42 CHRONIC LEFT-SIDED LOW BACK PAIN WITH LEFT-SIDED SCIATICA: Primary | ICD-10-CM

## 2018-11-01 PROCEDURE — G8982 BODY POS GOAL STATUS: HCPCS

## 2018-11-01 PROCEDURE — 97161 PT EVAL LOW COMPLEX 20 MIN: CPT

## 2018-11-01 PROCEDURE — G8981 BODY POS CURRENT STATUS: HCPCS

## 2018-11-01 NOTE — THERAPY EVALUATION
Outpatient Physical Therapy Ortho Initial Evaluation  [facility]     Patient Name: Julia Blackmon  : 1940  MRN: [id number]  Today's Date: 2018      Visit Date: 2018    Patient Active Problem List   Diagnosis   • Well woman exam with routine gynecological exam   • Incomplete rotator cuff tear   • Elevated blood pressure reading   • Chronic prescription opiate use   • Depression   • GERD (gastroesophageal reflux disease)   • Controlled type 2 diabetes mellitus without complication, without long-term current use of insulin (CMS/HCC)   • Chronic constipation   • Chronic back pain   • Nocturia   • Vaginal atrophy   • H/O: hysterectomy   • Slow transit constipation        Past Medical History:   Diagnosis Date   • Allergic rhinitis    • Atrophic vaginitis    • Cancer (CMS/HCC)     skin cancer   • Chronic constipation    • Chronic fatigue    • Chronic low back pain    • Chronic prescription opiate use    • Coronary artery disease    • Depression    • GERD (gastroesophageal reflux disease)    • [place] (hard of hearing)    • Memory change    • Meningioma (CMS/HCC)    • MI, 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   • Non-insulin dependent type 2 diabetes mellitus (CMS/HCC)    • Osteoarthritis    • Osteoporosis    • Pacemaker     home interogation   • Urinary frequency    • Wears dentures    • Wears glasses         Past Surgical History:   Procedure Laterality Date   • CHOLECYSTECTOMY     • EYE SURGERY      catarct extraction   • HYSTERECTOMY     • PACEMAKER IMPLANTATION   @ [facility]   • SHOULDER ARTHROSCOPY W/ ROTATOR CUFF REPAIR Right 2017    Procedure: SHOULDER ARTHROSCOPY WITH ROTATOR CUFF REPAIR RIGHT;  Surgeon: Hugo Ramos MD;  Location: [facility];  Service:        Visit Dx:     ICD-10-CM ICD-9-CM   1. Chronic left-sided low back pain with left-sided sciatica M54.42 724.2    G89.29 724.3     338.29             Patient History     Row Name 18 0855             History    Chief Complaint Difficulty with  daily activities;Joint stiffness;Muscle tenderness;Muscle weakness;Pain;Tightness  -MM      Type of Pain Back pain;Hip pain  -MM      Date Current Problem(s) Began 08/01/18  -MM      Brief Description of Current Complaint client presents with low back and left hip pain that worsened about 3 months ago insidious onset. She received 4 injections in her back 3 months ago and that helped some. She returned to her physician October 17, 2018 to see about getting more injections, but she was referred to PT. She has experienced problems with back pain for the past 25 years.   -MM      Patient/Caregiver Goals Relieve pain;Improve mobility;Improve strength  -MM      Occupation/sports/leisure activities social time with family and friends. she enjoys using the nu-step at the [facility]  -MM      What clinical tests have you had for this problem? X-ray  -MM      Are you or can you be pregnant No  -MM         Pain     Pain Location Back;Hip  -MM      Pain at Present 9  -MM      Pain at Best 6  -MM      Pain at Worst 10  -MM      Pain Frequency Constant/continuous  -MM      Pain Description Sharp  -MM      Pain Comments some improvement with hydrocodone and injections. She has worse pain with sitting.   -MM      Is your sleep disturbed? Yes  -MM      Difficulties with ADL's? sitting, sleeping,   -MM         Fall Risk Assessment    Any falls in the past year: No  -MM         Daily Activities    Primary Language Luxembourger  -MM      Action taken if English not primary language client speaks fluent english  -MM      Are you able to read Yes  -MM      Are you able to write Yes  -MM      Barriers to learning None  -MM      Pt Participated in POC and Goals Yes  -MM         Safety    Are you being hurt, hit, or frightened by anyone at home or in your life? No  -MM      Are you being neglected by a caregiver No  -MM        User Key  (r) = Recorded By, (t) = Taken By, (c) = Cosigned By    Initials Name Provider Type    MM Mickey Melvin, PT  Physical Therapist                PT Ortho     Row Name 11/01/18 0900       Precautions and Contraindications    Precautions/Limitations pacemaker  -MM       Posture/Observations    Posture/Observations Comments thoracic kyphosis and reduced lumbar lordosis.  Palpation: tenderness bilateral lumbar paravertebrals and left superior gluteal region to L SI joint.  -MM       Sensory Screen for Light Touch- Lower Quarter Clearing    S1 (bottom of foot) --   Diminished sensation reported in toes bilateral/Diabetes  -MM       Lumbar ROM Screen- Lower Quarter Clearing    Lumbar Flexion Impaired   major loss  -MM    Lumbar Extension Impaired   unable beyond neutral initial to major loss after press-up  -MM    Lumbar Lateral Flexion Impaired   SGIS: major loss bilateral  -MM    Lumbar Rotation Impaired  -MM       General ROM    GENERAL ROM COMMENTS Repeated prone to elbows and extended time prone elbows patient reports pain abolishing in her hip and back. She had pain initially and did not notice relief until the 3rd set of prone up on elbows. Extension ROM also improved with time on elbows. After standing she had minimal discomfort in her back.   -MM       MMT Right Lower Ext    Rt Hip Flexion MMT, Gross Movement (4-/5) good minus  -MM    Rt Hip ABduction MMT, Gross Movement (4-/5) good minus  -MM    Rt Hip ADduction MMT, Gross Movement (4/5) good  -MM    Rt Knee Extension MMT, Gross Movement (5/5) normal  -MM    Rt Knee Flexion MMT, Gross Movement (4+/5) good plus  -MM    Rt Ankle Plantarflexion MMT, Gross Movement (5/5) normal  -MM    Rt Ankle Dorsiflexion MMT, Gross Movement (4+/5) good plus  -MM       MMT Left Lower Ext    Lt Hip Flexion MMT, Gross Movement (4-/5) good minus  -MM    Lt Hip ABduction MMT, Gross Movement (4-/5) good minus   with pain  -MM    Lt Hip ADduction MMT, Gross Movement (4/5) good  -MM    Lt Knee Extension MMT, Gross Movement (5/5) normal  -MM    Lt Knee Flexion MMT, Gross Movement (4+/5) good plus   -MM    Lt Ankle Plantarflexion MMT, Gross Movement (4+/5) good plus  -MM    Lt Ankle Dorsiflexion MMT, Gross Movement (4+/5) good plus  -MM      User Key  (r) = Recorded By, (t) = Taken By, (c) = Cosigned By    Initials Name Provider Type    Mickey Tran, PT Physical Therapist          Therapy Education  Education Details: Provided and reviewed home program. Exercise included prone to elbows and prone on elbows extended time.            PT OP Goals     Row Name 11/01/18 0900          PT Short Term Goals    STG Date to Achieve 11/22/18  -MM     STG 1 Client will report 75% improvement in low back pain.  -MM     STG 1 Progress New  -MM     STG 2 Client will be independent in managing back pain and performing exercises to diminsh symptoms.   -MM     STG 2 Progress New  -MM     STG 3 Left low back and gluteal tenderness will resolve.   -MM     STG 3 Progress New  -MM     STG 4 Trunk extension ROM will improve to moderate loss or better.   -MM     STG 4 Progress New  -MM     STG 5 Pain into the left hip will resolve.   -MM     STG 5 Progress New  -MM        Long Term Goals    LTG Date to Achieve 11/29/18  -MM     LTG 1 Modified Oswestry score will improve to 30% or better.   -MM     LTG 1 Progress New  -MM     LTG 2 Client will improve to sitting and performing transfers without pain.  -MM     LTG 2 Progress New  -MM        Time Calculation    PT Goal Re-Cert Due Date 01/30/19  -MM       User Key  (r) = Recorded By, (t) = Taken By, (c) = Cosigned By    Initials Name Provider Type    Mickey Tran PT Physical Therapist                PT Assessment/Plan     Row Name 11/01/18 0900          PT Assessment    Functional Limitations Limitation in home management;Limitations in community activities;Limitations in functional capacity and performance;Performance in leisure activities;Performance in self-care ADL  -MM     Impairments Pain;Muscle strength;Range of motion;Posture  -MM     Assessment Comments Client  presents with evolving symptoms of low complexity. Signs and symptoms are consistent with low back pain related to possible derangement syndrome or mechanically inconclusive. She does experience significant relief with prone propped on elbows. Further skilled care is required to minimize pain and improve overall strength and mobility.   -MM     Please refer to paper survey for additional self-reported information Yes  -MM     Rehab Potential Good  -MM     Patient/caregiver participated in establishment of treatment plan and goals Yes  -MM     Patient would benefit from skilled therapy intervention Yes  -MM        PT Plan    PT Frequency 2x/week  -MM     Predicted Duration of Therapy Intervention (Therapy Eval) 8-12 visits  -MM     Planned CPT's? PT EVAL LOW COMPLEXITY: 27405;PT THER PROC EA 15 MIN: 26338;PT MANUAL THERAPY EA 15 MIN: 38600;PT ULTRASOUND EA 15 MIN: 54015;PT HOT OR COLD PACK TREAT MCARE  -MM     PT Plan Comments Continue per plan of treatment with extension exercises as able. Also include ASTYM and dry needling.  -MM       User Key  (r) = Recorded By, (t) = Taken By, (c) = Cosigned By    Initials Name Provider Type    MM Mickey Melvin, PT Physical Therapist        Outcome Measure Options: Modifed O[place]  Modified Oswestry  Modified Oswestry Score/Comments: 58%      Time Calculation:     Therapy Suggested Charges     Code   Minutes Charges    None             Start Time: 0900     Therapy Charges for Today     Code Description Service Date Service Provider Modifiers Qty    [id number]  PT [facility] MAIN POS CURRENT 11/1/2018 Mickey Melvin, PT GP, CK 1    [id number]  PT [facility] MAIN POS PROJECTED 11/1/2018 Mickey Melvin, PT GP, CJ 1    [id number]  PT EVAL LOW COMPLEXITY 4 11/1/2018 Mickey Melvin, PT GP 1          PT G-Codes  PT Professional Judgement Used?: Yes  Outcome Measure Options: Modifed Owestry  Modified Oswestry Score/Comments: 58%  Functional Limitation: Changing and maintaining  body position  Changing and Maintaining Body Position Current Status (): At least 40 percent but less than 60 percent impaired, limited or restricted  Changing and Maintaining Body Position Goal Status (): At least 20 percent but less than 40 percent impaired, limited or restricted         Mickey Melvin, PT  11/1/2018

## 2018-11-13 ENCOUNTER — APPOINTMENT (OUTPATIENT)
Dept: PHYSICAL THERAPY | Facility: HOSPITAL | Age: 78
End: 2018-11-13

## 2019-02-12 ENCOUNTER — DOCUMENTATION (OUTPATIENT)
Dept: PHYSICAL THERAPY | Facility: HOSPITAL | Age: 79
End: 2019-02-12

## 2019-02-12 DIAGNOSIS — G89.29 CHRONIC LEFT-SIDED LOW BACK PAIN WITH LEFT-SIDED SCIATICA: Primary | ICD-10-CM

## 2019-02-12 DIAGNOSIS — M54.42 CHRONIC LEFT-SIDED LOW BACK PAIN WITH LEFT-SIDED SCIATICA: Primary | ICD-10-CM

## 2019-02-12 NOTE — THERAPY DISCHARGE NOTE
Outpatient Physical Therapy Discharge Summary      Patient Name: Julia Blackmon  : 1940  MRN: [id number]    Today's Date: 2019    Visit Dx:    ICD-10-CM ICD-9-CM   1. Chronic left-sided low back pain with left-sided sciatica M54.42 724.2    G89.29 724.3     338.29     PT OP Goals     Row Name 19 0941          PT Short Term Goals    STG Date to Achieve  18  -AC     STG 1  Client will report 75% improvement in low back pain.  -AC     STG 1 Progress  Not Met  -AC     STG 2  Client will be independent in managing back pain and performing exercises to diminsh symptoms.   -AC     STG 2 Progress  Not Met  -AC     STG 3  Left low back and gluteal tenderness will resolve.   -AC     STG 3 Progress  Not Met  -AC     STG 4  Trunk extension ROM will improve to moderate loss or better.   -AC     STG 4 Progress  Not Met  -AC     STG 5  Pain into the left hip will resolve.   -AC     STG 5 Progress  Not Met  -AC        Long Term Goals    LTG Date to Achieve  18  -AC     LTG 1  Modified Oswestry score will improve to 30% or better.   -AC     LTG 1 Progress  Not Met  -AC     LTG 2  Client will improve to sitting and performing transfers without pain.  -AC     LTG 2 Progress  Not Met  -AC        Time Calculation    PT Goal Re-Cert Due Date  19  -AC       User Key  (r) = Recorded By, (t) = Taken By, (c) = Cosigned By    Initials Name Provider Type    Anne Johnson, PT Physical Therapist        OP PT Discharge Summary  Date of Discharge: 19  Reason for Discharge: Unable to participate  Outcomes Achieved: Unable to make functional progress toward goals at this time  Discharge Destination: Home with home program  Discharge Instructions/Additional Comments: Pt was seen for initial evaluation to address chronic left-sided low back pain.  Pt cancelled five consecutive visits following IE d/t not feeling up to therapy after undergoing a procedure at .  Pt did not call to reschedule any  further follow-ups beyond this time.  Pt will be discharged with original home program.    Time Calculation:   Start Time: 0941    Therapy Suggested Charges     Code   Minutes Charges    None           Anne Ferrara, PT  2/12/2019

## 2020-02-18 ENCOUNTER — HOSPITAL ENCOUNTER (OUTPATIENT)
Dept: PHYSICAL THERAPY | Facility: HOSPITAL | Age: 80
Setting detail: THERAPIES SERIES
Discharge: HOME | End: 2020-02-18

## 2020-02-18 DIAGNOSIS — G89.29 CHRONIC LEFT-SIDED LOW BACK PAIN WITH LEFT-SIDED SCIATICA: Primary | ICD-10-CM

## 2020-02-18 DIAGNOSIS — M54.42 CHRONIC LEFT-SIDED LOW BACK PAIN WITH LEFT-SIDED SCIATICA: Primary | ICD-10-CM

## 2020-02-18 PROCEDURE — 97161 PT EVAL LOW COMPLEX 20 MIN: CPT

## 2020-02-19 NOTE — THERAPY EVALUATION
Outpatient Physical Therapy Ortho Initial Evaluation  [facility]     Patient Name: Julia Blackmon  : 1940  MRN: [id number]  Today's Date: 2020      Visit Date: 2020    Patient Active Problem List   Diagnosis   • Well woman exam with routine gynecological exam   • Incomplete rotator cuff tear   • Elevated blood pressure reading   • Chronic prescription opiate use   • Depression   • GERD (gastroesophageal reflux disease)   • Controlled type 2 diabetes mellitus without complication, without long-term current use of insulin (CMS/HCC)   • Chronic constipation   • Chronic back pain   • Nocturia   • Vaginal atrophy   • H/O: hysterectomy   • Slow transit constipation        Past Medical History:   Diagnosis Date   • Allergic rhinitis    • Atrophic vaginitis    • Cancer (CMS/HCC)     skin cancer   • Chronic constipation    • Chronic fatigue    • Chronic low back pain    • Chronic prescription opiate use    • Coronary artery disease    • Depression    • GERD (gastroesophageal reflux disease)    • [place] (hard of hearing)    • Memory change    • Meningioma (CMS/HCC)    • MI, 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   • Non-insulin dependent type 2 diabetes mellitus (CMS/HCC)    • Osteoarthritis    • Osteoporosis    • Pacemaker     home interogation   • Urinary frequency    • Wears dentures    • Wears glasses         Past Surgical History:   Procedure Laterality Date   • CHOLECYSTECTOMY     • EYE SURGERY      catarct extraction   • HYSTERECTOMY     • PACEMAKER IMPLANTATION   @ [place]   • SHOULDER ARTHROSCOPY W/ ROTATOR CUFF REPAIR Right 2017    Procedure: SHOULDER ARTHROSCOPY WITH ROTATOR CUFF REPAIR RIGHT;  Surgeon: Hugo Ramos MD;  Location: [facility];  Service:        Visit Dx:     ICD-10-CM ICD-9-CM   1. Chronic left-sided low back pain with left-sided sciatica M54.42 724.2    G89.29 724.3     338.29         Patient History     Row Name 20 0800             History    Chief Complaint  Difficulty  Walking;Difficulty with daily activities;Muscle tenderness;Muscle weakness;Pain;Numbness;Tinglings;Joint stiffness  -MM      Type of Pain  Back pain;Lower Extremity / Leg;Hip pain  -MM      Date Current Problem(s) Began  -- back and left hip/leg pain for > 20 years. worse past year  -MM      Brief Description of Current Complaint  client presents with left low back and hip pain. she has had pain for > 20 years. symptoms have been worse in the past year. she has had multiple injections, but that didn't help. She hopes to minimize pain and improve mobility.  -MM      Patient/Caregiver Goals  Relieve pain  -MM      Occupation/sports/leisure activities  enjoys social time with friends/family  -MM         Pain     Pain Location  Back;Hip  -MM      Pain at Present  8  -MM      Pain at Best  7  -MM      Pain at Worst  10  -MM      Pain Frequency  Constant/continuous  -MM      Pain Description  Aching;Discomfort;Dull  -MM      Pain Comments  constant pain that frequently radiates to left knee.   -MM      Difficulties with ADL's?  sitting, walking, transfers  -MM         Fall Risk Assessment    Any falls in the past year:  No  -MM         Daily Activities    Primary Language  Filipino  -MM      Action taken if English not primary language  Client speaks fluent English  -MM      Are you able to read  Yes  -MM      Are you able to write  Yes  -MM      Barriers to learning  None  -MM      Pt Participated in POC and Goals  Yes  -MM        User Key  (r) = Recorded By, (t) = Taken By, (c) = Cosigned By    Initials Name Provider Type    MM Mickey Melvin, PT Physical Therapist          PT Ortho     Row Name 02/18/20 0845       Precautions and Contraindications    Precautions/Limitations  no known precautions/limitations  -MM    Precautions  pacemaker, history of osteoporosis  -MM       Posture/Observations    Posture/Observations Comments  thoracic kyphosis and reduced lumbar lordosis.  Palpation: tenderness bilateral lumbar  paravertebrals and left superior gluteal region to L SI joint.  -MM       General ROM    Head/Neck/Trunk  Trunk Extension;Trunk Flexion;Trunk Lt Lateral Flexion;Trunk Rt Lateral Flexion;Trunk Lt Rotation;Trunk Rt Rotation  -MM       Head/Neck/Trunk    Trunk Extension AROM  12  -MM    Trunk Flexion AROM  47  -MM    Trunk Lt Lateral Flexion AROM  severe loss of motion  -MM    Trunk Rt Lateral Flexion AROM  moderate loss of motion  -MM    Trunk Lt Rotation AROM  moderate loss of motion  -MM    Trunk Rt Rotation AROM  severe loss of motion  -MM       MMT (Manual Muscle Testing)    Rt Lower Ext  Rt Hip WNL;Rt Knee WNL;Rt Ankle Plantarflexion;Rt Ankle Dorsiflexion  -MM    Lt Lower Ext  Lt Hip Flexion;Lt Hip Extension;Lt Hip ABduction;Lt Hip ADduction;Lt Knee Extension;Lt Knee Flexion;Lt Ankle Plantarflexion;Lt Ankle Dorsiflexion  -MM       MMT Right Lower Ext    Rt Ankle Plantarflexion MMT, Gross Movement  (5/5) normal  -MM    Rt Ankle Dorsiflexion MMT, Gross Movement  (4+/5) good plus  -MM       Sensation    Additional Comments  numbness and tingling reported to left hip.   -MM      User Key  (r) = Recorded By, (t) = Taken By, (c) = Cosigned By    Initials Name Provider Type    MM Mickey Melvin, PT Physical Therapist          02/18/20 8394   PT Short Term Goals   STG Date to Achieve 03/10/20   STG 1 Client will report 50% improvement in low back pain with daily activity.   STG 1 Progress New   STG 2 Client will report 50% improvement in left hip pain with daily activity.   STG 2 Progress New   STG 3 Client will be independent with home program to minimize pain and improve overall mobility and function.   STG 3 Progress New   STG 4 Left lumbar tenderness will resolve.   STG 4 Progress New   Long Term Goals   LTG Date to Achieve 03/31/20   LTG 1 LEFS score will improve to 40% or better.   LTG 1 Progress New   LTG 2 Client will perform transfers sit to stand without difficulty.   LTG 2 Progress New   LTG 3 Client will   light items from the floor without difficulty.   LTG 3 Progress New   Time Calculation   PT Goal Re-Cert Due Date 05/18/20 02/18/20 0839   PT Assessment   Functional Limitations Limitation in home management;Limitations in community activities;Performance in leisure activities;Performance in self-care ADL   Impairments Pain;Muscle strength;Range of motion;Joint mobility   Assessment Comments Client presents with evolving symptoms of low complexity. Signs and symptoms are consistent with low back pain and left hip pain related to degenerative changes in the spine. She also has signs of trochanteric bursitis. She has had chronic pain for several years that worsened recently. Skilled care is required to provide exercises to help manage symptoms and to provide manual therapy techniques to help minimize pain.    Please refer to paper survey for additional self-reported information Yes   Rehab Potential Fair   Patient/caregiver participated in establishment of treatment plan and goals Yes   Patient would benefit from skilled therapy intervention Yes   PT Plan   PT Frequency 1x/week;2x/week   Predicted Duration of Therapy Intervention (Therapy Eval) 6-10 visits   Planned CPT's? PT EVAL LOW COMPLEXITY: 43409;PT THER PROC EA 15 MIN: 21102;PT THER ACT EA 15 MIN: 89935;PT MANUAL THERAPY EA 15 MIN: 22753;PT NEUROMUSC RE-EDUCATION EA 15 MIN: 31222;PT HOT OR COLD PACK TREAT MCARE   Physical Therapy Interventions (Optional Details) dry needling   PT Plan Comments Continue per plan of treatment with mobility exercises as able. Also include manual therapy and consider TDN.      Therapy Education  Education Details: Home program included: press ups in small range, standing towel extension, and standing side glides.   Given: HEP  Program: New  How Provided: Verbal  Provided to: Patient  Level of Understanding: Teach back education performed     Outcome Measure Options: Lower Extremity Functional Scale (LEFS), FGA  (Functional Gait Assessment)  Lower Extremity Functional Index  Any of your usual work, housework or school activities: Extreme difficulty or unable to perform activity  Your usual hobbies, recreational or sporting activities: Extreme difficulty or unable to perform activity  Getting into or out of the bath: Quite a bit of difficulty  Walking between rooms: Moderate difficulty  Putting on your shoes or socks: Quite a bit of difficulty  Squatting: Quite a bit of difficulty  Lifting an object, like a bag of groceries from the floor: Extreme difficulty or unable to perform activity  Performing light activities around your home: Quite a bit of difficulty  Performing heavy activities around your home: Extreme difficulty or unable to perform activity  Getting into or out of a car: Quite a bit of difficulty  Walking 2 blocks: Quite a bit of difficulty  Walking a mile: Extreme difficulty or unable to perform activity  Going up or down 10 stairs (about 1 flight of stairs): Quite a bit of difficulty  Standing for 1 hour: Extreme difficulty or unable to perform activity  Sitting for 1 hour: Quite a bit of difficulty  Running on even ground: Extreme difficulty or unable to perform activity  Running on uneven ground: Extreme difficulty or unable to perform activity  Making sharp turns while running fast: Extreme difficulty or unable to perform activity  Hopping: Extreme difficulty or unable to perform activity  Rolling over in bed: Moderate difficulty  Total: 12      Time Calculation:     Start Time: 0845     Therapy Charges for Today     Code Description Service Date Service Provider Modifiers Qty    43752988651 HC PT EVAL LOW COMPLEXITY 3 2/18/2020 Mickey Melvin, PT GP 1          PT G-Codes  Outcome Measure Options: Lower Extremity Functional Scale (LEFS), FGA (Functional Gait Assessment)  Total: 12         Mickey Melvin, PT  2/19/2020

## 2020-02-21 ENCOUNTER — TRANSCRIBE ORDERS (OUTPATIENT)
Dept: PHYSICAL THERAPY | Facility: HOSPITAL | Age: 80
End: 2020-02-21

## 2020-02-21 DIAGNOSIS — M70.60 TROCHANTERIC BURSITIS, UNSPECIFIED LATERALITY: Primary | ICD-10-CM

## 2020-02-25 ENCOUNTER — HOSPITAL ENCOUNTER (OUTPATIENT)
Dept: PHYSICAL THERAPY | Facility: HOSPITAL | Age: 80
Setting detail: THERAPIES SERIES
Discharge: HOME | End: 2020-02-25

## 2020-02-25 DIAGNOSIS — G89.29 CHRONIC LEFT-SIDED LOW BACK PAIN WITH LEFT-SIDED SCIATICA: Primary | ICD-10-CM

## 2020-02-25 DIAGNOSIS — M54.42 CHRONIC LEFT-SIDED LOW BACK PAIN WITH LEFT-SIDED SCIATICA: Primary | ICD-10-CM

## 2020-02-25 PROCEDURE — 97110 THERAPEUTIC EXERCISES: CPT

## 2020-02-25 PROCEDURE — 97140 MANUAL THERAPY 1/> REGIONS: CPT

## 2020-02-25 NOTE — THERAPY TREATMENT NOTE
Outpatient Physical Therapy Ortho Treatment Note   [facility]     Patient Name: Julia Blackmon  : 1940  MRN: [id number]  Today's Date: 2020      Visit Date: 2020    Visit Dx:    ICD-10-CM ICD-9-CM   1. Chronic left-sided low back pain with left-sided sciatica M54.42 724.2    G89.29 724.3     338.29     Patient Active Problem List   Diagnosis   • Well woman exam with routine gynecological exam   • Incomplete rotator cuff tear   • Elevated blood pressure reading   • Chronic prescription opiate use   • Depression   • GERD (gastroesophageal reflux disease)   • Controlled type 2 diabetes mellitus without complication, without long-term current use of insulin (CMS/[facility])   • Chronic constipation   • Chronic back pain   • Nocturia   • Vaginal atrophy   • H/O: hysterectomy   • Slow transit constipation     Past Medical History:   Diagnosis Date   • Allergic rhinitis    • Atrophic vaginitis    • Cancer (CMS/HCC)     skin cancer   • Chronic constipation    • Chronic fatigue    • Chronic low back pain    • Chronic prescription opiate use    • Coronary artery disease    • Depression    • GERD (gastroesophageal reflux disease)    • [place] (hard of hearing)    • Memory change    • Meningioma (CMS/HCC)    • MI, 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   • Non-insulin dependent type 2 diabetes mellitus (CMS/HCC)    • Osteoarthritis    • Osteoporosis    • Pacemaker     home interogation   • Urinary frequency    • Wears dentures    • Wears glasses         Past Surgical History:   Procedure Laterality Date   • CHOLECYSTECTOMY     • EYE SURGERY      catarct extraction   • HYSTERECTOMY     • PACEMAKER IMPLANTATION   @ [place]   • SHOULDER ARTHROSCOPY W/ ROTATOR CUFF REPAIR Right 2017    Procedure: SHOULDER ARTHROSCOPY WITH ROTATOR CUFF REPAIR RIGHT;  Surgeon: Hugo Ramos MD;  Location: [facility];  Service:      PT Assessment/Plan     Row Name 20 0845          PT Assessment    Assessment Comments  No complaints with  manual therapy or exercises. She continues with quite a bit of constant pain and is hoping for some form of relief.   -MM        PT Plan    PT Plan Comments  Continue per plan of treatment with manual therapy and exercises.   -MM       User Key  (r) = Recorded By, (t) = Taken By, (c) = Cosigned By    Initials Name Provider Type    Mickey Tran, PT Physical Therapist        OP Exercises     Row Name 02/25/20 0845             Subjective Comments    Subjective Comments  Client reports constant severe pain.   -MM         Subjective Pain    Able to rate subjective pain?  yes  -MM      Pre-Treatment Pain Level  8  -MM      Post-Treatment Pain Level  8  -MM         Total Minutes    09602 - PT Therapeutic Exercise Minutes  15  -MM      03759 - PT Manual Therapy Minutes  15  -MM         Exercise 1    Exercise Name 1  Updated and reviewed home program. Exercises included: sit stretch/stand/raise, standing extension with towel, standing rotation with towel and standing sideglides bilateral.   -MM      Cueing 1  Verbal  -MM      Time 1  15  -MM      Additional Comments  ther ex  -MM        User Key  (r) = Recorded By, (t) = Taken By, (c) = Cosigned By    Initials Name Provider Type    Mickey Tran, PT Physical Therapist           Manual Rx (last 36 hours)      Manual Treatments     Row Name 02/25/20 0845             Total Minutes    74979 - PT Manual Therapy Minutes  15  -MM         Manual Rx 1    Manual Rx 1 Location  bilateral lumbar region and left gluteal region  -MM      Manual Rx 1 Type  Provided soft tissue mobilization using ASTYM technique. client was prone and properly draped. Moderate pressure was used with ASTYM.   -MM      Manual Rx 1 Duration  15  -MM         Manual Rx 2    Manual Rx 2 Location  Included TDN for bilateral lumbar pararvertebrals and left greater trochanter (not included in timed minutes)  -MM        User Key  (r) = Recorded By, (t) = Taken By, (c) = Cosigned By    Initials Name  Provider Type     Mickey Melvin, PT Physical Therapist        Time Calculation:   Start Time: 0845  Therapy Charges for Today     Code Description Service Date Service Provider Modifiers Qty    30441275817  PT THER PROC EA 15 MIN 2/25/2020 Mickey Melvin, PT GP 1    [id number]  PT MANUAL THERAPY EA 15 MIN 2/25/2020 Mickey Melvin, PT GP 1        Mickey Melvin, PT  2/25/2020

## 2020-03-03 ENCOUNTER — HOSPITAL ENCOUNTER (OUTPATIENT)
Dept: PHYSICAL THERAPY | Facility: HOSPITAL | Age: 80
Setting detail: THERAPIES SERIES
Discharge: HOME | End: 2020-03-03

## 2020-03-24 ENCOUNTER — DOCUMENTATION (OUTPATIENT)
Dept: PHYSICAL THERAPY | Facility: HOSPITAL | Age: 80
End: 2020-03-24

## 2020-03-24 DIAGNOSIS — M54.42 CHRONIC LEFT-SIDED LOW BACK PAIN WITH LEFT-SIDED SCIATICA: Primary | ICD-10-CM

## 2020-03-24 DIAGNOSIS — G89.29 CHRONIC LEFT-SIDED LOW BACK PAIN WITH LEFT-SIDED SCIATICA: Primary | ICD-10-CM

## 2020-03-24 NOTE — THERAPY DISCHARGE NOTE
Outpatient Physical Therapy Discharge Summary         Patient Name: Julia Blackmon  : 1940  MRN: [id number]    Today's Date: 3/24/2020    Visit Dx:    ICD-10-CM ICD-9-CM   1. Chronic left-sided low back pain with left-sided sciatica M54.42 724.2    G89.29 724.3     338.29       PT OP Goals     Row Name 20 0916          PT Short Term Goals    STG Date to Achieve  03/10/20  -AC     STG 1  Client will report 50% improvement in low back pain with daily activity.  -AC     STG 1 Progress  Not Met  -AC     STG 2  Client will report 50% improvement in left hip pain with daily activity.  -AC     STG 2 Progress  Not Met  -AC     STG 3  Client will be independent with home program to minimize pain and improve overall mobility and function.  -AC     STG 3 Progress  Met  -AC     STG 4  Left lumbar tenderness will resolve.  -AC     STG 4 Progress  Not Met  -AC        Long Term Goals    LTG Date to Achieve  20  -AC     LTG 1  LEFS score will improve to 40% or better.  -AC     LTG 1 Progress  Not Met  -AC     LTG 2  Client will perform transfers sit to stand without difficulty.  -AC     LTG 2 Progress  Not Met  -AC     LTG 3  Client will  light items from the floor without difficulty.  -AC     LTG 3 Progress  Not Met  -AC        Time Calculation    PT Goal Re-Cert Due Date  20  -AC       User Key  (r) = Recorded By, (t) = Taken By, (c) = Cosigned By    Initials Name Provider Type    Anne Johnson, PT Physical Therapist          OP PT Discharge Summary  Date of Discharge: 20  Reason for Discharge: Non-compliant  Outcomes Achieved: Patient able to partially acheive established goals  Discharge Destination: Home with home program  Discharge Instructions/Additional Comments: Pt seen for IE and one follow-up to address LBP and LLE radiculopathy. Pt cancelled last three treatments and did not reschedule further follow-ups. D/c due to lapse in treatment.       Time Calculation:                     Anne Ferrara, PT  3/24/2020

## 2022-02-01 ENCOUNTER — OFFICE VISIT (OUTPATIENT)
Dept: GASTROENTEROLOGY | Facility: CLINIC | Age: 82
End: 2022-02-01

## 2022-02-01 VITALS
TEMPERATURE: 98 F | HEART RATE: 74 BPM | WEIGHT: 127.6 LBS | SYSTOLIC BLOOD PRESSURE: 141 MMHG | BODY MASS INDEX: 24.92 KG/M2 | DIASTOLIC BLOOD PRESSURE: 67 MMHG

## 2022-02-01 DIAGNOSIS — Z20.822 ENCOUNTER FOR PREPROCEDURE SCREENING LABORATORY TESTING FOR COVID-19: Primary | ICD-10-CM

## 2022-02-01 DIAGNOSIS — K21.9 GASTROESOPHAGEAL REFLUX DISEASE WITHOUT ESOPHAGITIS: Primary | ICD-10-CM

## 2022-02-01 DIAGNOSIS — Z01.812 ENCOUNTER FOR PREPROCEDURE SCREENING LABORATORY TESTING FOR COVID-19: Primary | ICD-10-CM

## 2022-02-01 DIAGNOSIS — D12.5 ADENOMATOUS POLYP OF SIGMOID COLON: ICD-10-CM

## 2022-02-01 PROCEDURE — 99204 OFFICE O/P NEW MOD 45 MIN: CPT | Performed by: INTERNAL MEDICINE

## 2022-02-01 RX ORDER — ESOMEPRAZOLE MAGNESIUM 40 MG/1
40 CAPSULE, DELAYED RELEASE ORAL 2 TIMES DAILY
Qty: 180 CAPSULE | Refills: 11 | Status: SHIPPED | OUTPATIENT
Start: 2022-02-01

## 2022-02-01 RX ORDER — DAPAGLIFLOZIN 10 MG/1
1 TABLET, FILM COATED ORAL DAILY
COMMUNITY

## 2022-02-01 RX ORDER — POLYETHYLENE GLYCOL 3350 17 G/17G
POWDER, FOR SOLUTION ORAL DAILY
COMMUNITY

## 2022-02-01 RX ORDER — FAMOTIDINE 40 MG/1
40 TABLET, FILM COATED ORAL
COMMUNITY
Start: 2021-12-17 | End: 2022-02-01

## 2022-02-01 NOTE — PROGRESS NOTES
GASTROENTEROLOGY OFFICE NOTE  Julia Blackmon  [id number]  1940      Chief Complaint   Patient presents with   • Heartburn        HISTORY OF PRESENT ILLNESS:  81-year-old white female originally from the [facility] who I saw many years ago.  Is been many years since I last saw her.    She has a history of adenomatous colonic polyps on her 2016 colonoscopy.    She is here today because of her chronic reflux which has been usually well controlled with Nexium 40 mg once daily but in the past year has become fairly severe.  She has been seen by ENT and ENT evaluation apparently was nondiagnostic.  Pepcid was added to her regimen with no improvement of her worsening GERD which is occurring on a daily basis is a retrosternal burning type sensation.    Fortunately she is not complaining of dysphagia to solids or odynophagia she denies early satiety and has not noted any unexplained weight loss.  She denies melena or bright red blood per rectum.    She is here today to specifically address her worsening GERD.    PAST MEDICAL HISTORY  Past Medical History:   Diagnosis Date   • Allergic rhinitis    • Atrophic vaginitis    • Cancer (HCC)     skin cancer   • Chronic constipation    • Chronic fatigue    • Chronic low back pain    • Chronic prescription opiate use    • Coronary artery disease    • Depression    • GERD (gastroesophageal reflux disease)    • [place] (hard of hearing)    • Memory change    • Meningioma (HCC)    • MI, 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   • Non-insulin dependent type 2 diabetes mellitus (HCC)    • Osteoarthritis    • Osteoporosis    • Pacemaker     home interogation   • Urinary frequency    • Wears dentures    • Wears glasses         PAST SURGICAL HISTORY  Past Surgical History:   Procedure Laterality Date   • CHOLECYSTECTOMY     • COLONOSCOPY     • EYE SURGERY      catarct extraction   • HYSTERECTOMY     • PACEMAKER IMPLANTATION  2005 @ [place]   • SHOULDER ARTHROSCOPY W/ ROTATOR CUFF REPAIR Right 11/7/2017     Procedure: SHOULDER ARTHROSCOPY WITH ROTATOR CUFF REPAIR RIGHT;  Surgeon: Hugo Ramos MD;  Location: [facility];  Service:         MEDICATIONS:    Current Outpatient Medications:   •  aspirin 81 MG EC tablet, Take 81 mg by mouth Daily., Disp: , Rfl:   •  atorvastatin (LIPITOR) 10 MG tablet, Take 10 mg by mouth Daily., Disp: , Rfl:   •  Dapagliflozin Propanediol (Farxiga) 10 MG tablet, Take 1 tablet by mouth Daily., Disp: , Rfl:   •  esomeprazole (nexIUM) 40 MG capsule, Take 40 mg by mouth Every Morning Before Breakfast., Disp: , Rfl:   •  famotidine (PEPCID) 40 MG tablet, Take 40 mg by mouth every night at bedtime., Disp: , Rfl:   •  HYDROcodone-acetaminophen (NORCO)  MG per tablet, TK 1 T PO QID, Disp: , Rfl: 0  •  metFORMIN (GLUCOPHAGE) 500 MG tablet, Take 500 mg by mouth 2 (Two) Times a Day With Meals., Disp: , Rfl:   •  polyethylene glycol (MIRALAX) 17 GM/SCOOP powder, Daily., Disp: , Rfl:   •  Calcium Citrate-Vitamin D (CALCIUM + D PO), Take 600 mg by mouth Daily., Disp: , Rfl:   •  Escitalopram Oxalate (LEXAPRO PO), Take  by mouth., Disp: , Rfl:   •  estradiol (ESTRACE) 0.1 MG/GM vaginal cream, Insert 0.5 g into the vagina Take As Directed. Twice weekly, Disp: , Rfl:   •  lidocaine (LIDODERM) 5 %, Place 1 patch on the skin Take As Directed. apply three patches to skin daily and remove for 12 hours, Disp: , Rfl:   •  triamcinolone (KENALOG) 0.025 % cream, APPLY A THIN LAYER TO BOTH EYELIDS TID UTD, Disp: , Rfl: 1  •  VOLTAREN 1 % gel gel, LINDY AA BID UTD, Disp: , Rfl: 4    ALLERGIES  Allergies   Allergen Reactions   • Cortisone Itching       FAMILY HISTORY:  Family History   Problem Relation Age of Onset   • Osteoporosis Mother    • Stroke Mother    • Heart attack Mother    • Lung cancer Brother    • Colon cancer Neg Hx    • Colon polyps Neg Hx        SOCIAL HISTORY  Social History     Socioeconomic History   • Marital status: Single   Tobacco Use   • Smoking status: Never Smoker   • Smokeless tobacco:  Never Used   Vaping Use   • Vaping Use: Never used   Substance and Sexual Activity   • Alcohol use: No   • Drug use: No   • Sexual activity: Never     Socioeconomic History:  She is originally from the [facility] and has two daughters one lives in [place].  She has two grandchildren and two great-grandchildren that live here in [place].         PHYSICAL EXAM   There were no vitals taken for this visit.  General: Alert and oriented x 3. In no apparent or acute distress.  and No stigmata of chronic liver disease  HEENT: Wearing facemask.  Anicteric sclera  Neck: Supple. Without lymphadenopathy  CV: Regular rate and rhythm, S1, S2  Lungs: Clear to ausculation. Without rales, rhonchi and wheezing  Abdomen:  Soft,non-distended without palpable masses or hepatosplenomeagaly, areas of rebound tenderness or guarding.   Extremeties: without clubbing, cyanosis or edema  Neurologic:  Alert and oriented x 3 without focal motor or sensory deficits  Rectal exam: deferred       ASSESSMENT  1.-Worsening gastroesophageal reflux disease  2.-History of adenomatous colonic polyps.  Tubulovillous adenoma removed in 2015  3.-Advanced age  4.-Esophageal diverticulum on recent esophagram      PLAN  1.-Increase Nexium to 40 mg p.o. twice daily  2.-EGD for further assessment of her worsening dyspeptic symptoms of unclear etiology rule out erosive esophagitis, eosinophilic esophagitis neoplasia, infectious/viral/fungal esophagitis, neoplasia  3.-Will discuss with patient surveillance colonoscopy.  Given her age I think it might be reasonable to defer but if she feels up to it given the 7-years since her last colonoscopy where she had a villous adenoma removed I think it would be reasonable to consider if she wishes to pursue that.      Zion Andrew MD  2/1/2022   14:44 EST

## 2022-04-17 ENCOUNTER — APPOINTMENT (OUTPATIENT)
Dept: PREADMISSION TESTING | Facility: HOSPITAL | Age: 82
End: 2022-04-17

## 2022-04-17 LAB — SARS-COV-2 RNA PNL SPEC NAA+PROBE: NOT DETECTED

## 2022-04-17 PROCEDURE — C9803 HOPD COVID-19 SPEC COLLECT: HCPCS

## 2022-04-17 PROCEDURE — U0004 COV-19 TEST NON-CDC HGH THRU: HCPCS

## 2022-04-19 ENCOUNTER — OUTSIDE FACILITY SERVICE (OUTPATIENT)
Dept: GASTROENTEROLOGY | Facility: CLINIC | Age: 82
End: 2022-04-19

## 2022-04-19 PROCEDURE — 88305 TISSUE EXAM BY PATHOLOGIST: CPT | Performed by: INTERNAL MEDICINE

## 2022-04-19 PROCEDURE — 43239 EGD BIOPSY SINGLE/MULTIPLE: CPT | Performed by: INTERNAL MEDICINE

## 2022-04-19 RX ORDER — ESOMEPRAZOLE MAGNESIUM 40 MG/1
40 CAPSULE, DELAYED RELEASE ORAL 2 TIMES DAILY
Qty: 180 CAPSULE | Refills: 3 | Status: SHIPPED | OUTPATIENT
Start: 2022-04-19

## 2022-04-20 ENCOUNTER — LAB REQUISITION (OUTPATIENT)
Dept: LAB | Facility: HOSPITAL | Age: 82
End: 2022-04-20

## 2022-04-20 DIAGNOSIS — R12 HEARTBURN: ICD-10-CM

## 2022-04-20 DIAGNOSIS — K21.9 GASTRO-ESOPHAGEAL REFLUX DISEASE WITHOUT ESOPHAGITIS: ICD-10-CM

## 2022-04-20 DIAGNOSIS — R10.13 EPIGASTRIC PAIN: ICD-10-CM

## 2022-04-21 LAB
CYTO UR: NORMAL
LAB AP CASE REPORT: NORMAL
LAB AP CLINICAL INFORMATION: NORMAL
PATH REPORT.FINAL DX SPEC: NORMAL
PATH REPORT.GROSS SPEC: NORMAL

## 2024-01-20 PROBLEM — N95.2 VAGINAL ATROPHY: Status: RESOLVED | Noted: 2018-08-23 | Resolved: 2024-01-20

## 2024-01-20 PROBLEM — Z90.710 H/O: HYSTERECTOMY: Status: RESOLVED | Noted: 2018-08-23 | Resolved: 2024-01-20

## 2024-01-25 ENCOUNTER — OFFICE VISIT (OUTPATIENT)
Dept: OBSTETRICS AND GYNECOLOGY | Facility: CLINIC | Age: 84
End: 2024-01-25
Payer: MEDICARE

## 2024-01-25 VITALS
WEIGHT: 123 LBS | BODY MASS INDEX: 24.02 KG/M2 | SYSTOLIC BLOOD PRESSURE: 124 MMHG | DIASTOLIC BLOOD PRESSURE: 70 MMHG | RESPIRATION RATE: 14 BRPM

## 2024-01-25 DIAGNOSIS — N95.2 ATROPHIC VAGINITIS: ICD-10-CM

## 2024-01-25 DIAGNOSIS — N34.3 URETHRAL SYNDROME: Primary | ICD-10-CM

## 2024-01-25 PROBLEM — E11.9 CONTROLLED TYPE 2 DIABETES MELLITUS: Status: ACTIVE | Noted: 2024-01-25

## 2024-01-25 PROBLEM — R35.1 NOCTURIA: Status: RESOLVED | Noted: 2018-08-23 | Resolved: 2024-01-25

## 2024-01-25 PROBLEM — R03.0 ELEVATED BLOOD PRESSURE READING: Status: RESOLVED | Noted: 2017-11-07 | Resolved: 2024-01-25

## 2024-01-25 PROBLEM — F41.9 ANXIETY: Status: ACTIVE | Noted: 2024-01-25

## 2024-01-25 PROBLEM — K59.01 SLOW TRANSIT CONSTIPATION: Status: RESOLVED | Noted: 2018-08-23 | Resolved: 2024-01-25

## 2024-01-25 PROBLEM — E11.9 CONTROLLED TYPE 2 DIABETES MELLITUS WITHOUT COMPLICATION, WITHOUT LONG-TERM CURRENT USE OF INSULIN: Status: RESOLVED | Noted: 2017-11-07 | Resolved: 2024-01-25

## 2024-01-25 PROBLEM — E78.00 HIGH CHOLESTEROL: Status: ACTIVE | Noted: 2024-01-25

## 2024-01-25 PROCEDURE — 99203 OFFICE O/P NEW LOW 30 MIN: CPT | Performed by: OBSTETRICS & GYNECOLOGY

## 2024-01-25 NOTE — PROGRESS NOTES
Subjective   Chief Complaint   Patient presents with    Gynecologic Exam       Julia Blackmon is a 83 y.o. year old .  No LMP recorded. Patient is postmenopausal.  She comes today to talk about issues of recurring urinary tract infections.  She was doing fine until roughly September when she had what she perceives to be a very bad urinary tract infection.  She saw both urgent treatment center and her primary care who prescribed a series of different antibiotics none of which helped.  Apparently had hematuria.  She ended up going to see urology (Dr. 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  He is prescribed estrogen creams which she has been using 3 times weekly in the vagina and symptoms have gotten markedly better.  Reportedly also has had cystoscopy which was normal after which point the cream was prescribed.  None of these records are available for review.    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  Without having the records to review I cannot comment on whether she had a UTI or not but anticipate she probably  did not have a UTI given the history provided  Have explained that this is not thought to be harmful and can be used for however long as necessary.  Treatment can be probably used twice weekly if that is sufficient  The importance of keeping all planned follow-up and taking all medications as prescribed was emphasized.  Follow up only as needed            This note was electronically signed.    Sukhwinder Larson M.D.  January 25, 2024      Part of this note may be an electronic transcription/translation of spoken language to printed text using the Dragon Dictation System.

## (undated) DEVICE — CVR HNDL LT SURG ACCSSRY BLU STRL

## (undated) DEVICE — DRSNG WND GZ CURAD OIL EMULSION 3X8IN STRL PK/3

## (undated) DEVICE — ADAPT ST INFUS ADMIN SYR 70IN

## (undated) DEVICE — DYONICS 25 INFLOW TUBE SET, 3 PER BOX

## (undated) DEVICE — CANNULA SMOOTH FLEX 6.5 X 72MM: Brand: CLEAR-TRAC

## (undated) DEVICE — 90403 SHOULDER ARTHROSCOPY KIT: Brand: 90403 SHOULDER ARTHROSCOPY KIT

## (undated) DEVICE — INTENDED FOR TISSUE SEPARATION, AND OTHER PROCEDURES THAT REQUIRE A SHARP SURGICAL BLADE TO PUNCTURE OR CUT.: Brand: BARD-PARKER ® SAFETYLOCK CARBON RIB-BACK BLADES

## (undated) DEVICE — 4.5 MM DYONICS BONECUTTER                                    ELECTROBLADE, YELLOW, PACKAGED 3 PER                                    BOX, STERILE: Brand: DYONICS ELECTROBLADE BONECUTTER

## (undated) DEVICE — MEDI-VAC YANKAUER SUCTION HANDLE W/BULBOUS TIP: Brand: CARDINAL HEALTH

## (undated) DEVICE — SUT ETHLN 4/0 PS2 18IN 1667H

## (undated) DEVICE — HDRST POSTIN FM CRDL TRACH SLOT 9X8X4IN

## (undated) DEVICE — PK ARTHSCP SHLDR 10

## (undated) DEVICE — NERVE BLOCK SUPPORT KIT/BLUE: Brand: MEDLINE INDUSTRIES, INC.

## (undated) DEVICE — SPNG GZ WOVN 4X4IN 12PLY 10/BX STRL

## (undated) DEVICE — GLV SURG SIGNATURE TOUCH PF LTX 7.5 STRL BX/50

## (undated) DEVICE — SLNG ULTRSLING2 11TO13IN MD

## (undated) DEVICE — 3M™ STERI-DRAPE™ U-DRAPE 1015: Brand: STERI-DRAPE™

## (undated) DEVICE — DRAPE,TOP,102X53,STERILE: Brand: MEDLINE

## (undated) DEVICE — DRSNG PAD ABD 8X10IN STRL

## (undated) DEVICE — PAD ARMBRD SURG CONVOL 7.5X20X2IN

## (undated) DEVICE — TUBING, SUCTION, 1/4" X 10', STRAIGHT: Brand: MEDLINE

## (undated) DEVICE — ST NERV BLCK CONT CONTIPLEX ECHO CLSD 18G 4IN

## (undated) DEVICE — GLV SURG TRIUMPH ORTHO W/ALOE PF LTX 7.5 STRL

## (undated) DEVICE — GLV SURG TRIUMPH ORTHO W/ALOE PF LTX 8 STRL

## (undated) DEVICE — GLV SURG SIGNATURE TOUCH PF LTX 8 STRL BX/50

## (undated) DEVICE — GLV SURG TRIUMPH ORTHO W/ALOE PF LTX 8.5 STRL

## (undated) DEVICE — INTENDED USE FOR SURGICAL MARKING ON INTACT SKIN, ALSO PROVIDES A PERMANENT METHOD OF IDENTIFYING OBJECTS IN THE OPERATING ROOM: Brand: WRITESITE® REGULAR TIP SKIN MARKER

## (undated) DEVICE — 3M™ STERI-DRAPE™ INCISE DRAPE 1050 (60CM X 45CM): Brand: STERI-DRAPE™

## (undated) DEVICE — AIRWY 90MM NO9

## (undated) DEVICE — NDL HYPO ECLPS SFTY 22G 1 1/2IN

## (undated) DEVICE — GLV SURG SENSICARE MICRO PF LF 6 STRL

## (undated) DEVICE — SUT ETHIB 2 LR 30IN X496T

## (undated) DEVICE — MEDI-VAC NON-CONDUCTIVE SUCTION TUBING: Brand: CARDINAL HEALTH

## (undated) DEVICE — CANNULA,OXY,ADULT,SUPERSOFT,W/7'TUB,UC: Brand: MEDLINE

## (undated) DEVICE — CONN TBG Y 5 IN 1 LF STRL

## (undated) DEVICE — SUT PDS 1 CTX 36IN VIO PDP371T